# Patient Record
Sex: MALE | Race: WHITE | NOT HISPANIC OR LATINO | Employment: OTHER | URBAN - METROPOLITAN AREA
[De-identification: names, ages, dates, MRNs, and addresses within clinical notes are randomized per-mention and may not be internally consistent; named-entity substitution may affect disease eponyms.]

---

## 2017-02-17 DIAGNOSIS — E78.5 HYPERLIPIDEMIA LDL GOAL <130: ICD-10-CM

## 2017-02-17 DIAGNOSIS — E03.9 HYPOTHYROIDISM, UNSPECIFIED TYPE: ICD-10-CM

## 2017-02-17 DIAGNOSIS — Z00.00 ROUTINE GENERAL MEDICAL EXAMINATION AT A HEALTH CARE FACILITY: ICD-10-CM

## 2017-02-17 LAB
ALBUMIN SERPL-MCNC: 4 G/DL (ref 3.4–5)
ALP SERPL-CCNC: 47 U/L (ref 40–150)
ALT SERPL W P-5'-P-CCNC: 42 U/L (ref 0–70)
ANION GAP SERPL CALCULATED.3IONS-SCNC: 8 MMOL/L (ref 3–14)
AST SERPL W P-5'-P-CCNC: 22 U/L (ref 0–45)
BASOPHILS # BLD AUTO: 0 10E9/L (ref 0–0.2)
BASOPHILS NFR BLD AUTO: 0.6 %
BILIRUB DIRECT SERPL-MCNC: 0.1 MG/DL (ref 0–0.2)
BILIRUB SERPL-MCNC: 0.5 MG/DL (ref 0.2–1.3)
BUN SERPL-MCNC: 22 MG/DL (ref 7–30)
CALCIUM SERPL-MCNC: 9 MG/DL (ref 8.5–10.1)
CHLORIDE SERPL-SCNC: 107 MMOL/L (ref 94–109)
CHOLEST SERPL-MCNC: 135 MG/DL
CO2 SERPL-SCNC: 25 MMOL/L (ref 20–32)
CREAT SERPL-MCNC: 1.01 MG/DL (ref 0.66–1.25)
DIFFERENTIAL METHOD BLD: NORMAL
EOSINOPHIL # BLD AUTO: 0.4 10E9/L (ref 0–0.7)
EOSINOPHIL NFR BLD AUTO: 5.3 %
ERYTHROCYTE [DISTWIDTH] IN BLOOD BY AUTOMATED COUNT: 14 % (ref 10–15)
GFR SERPL CREATININE-BSD FRML MDRD: 75 ML/MIN/1.7M2
GLUCOSE SERPL-MCNC: 116 MG/DL (ref 70–99)
HBA1C MFR BLD: 5.5 % (ref 4.3–6)
HCT VFR BLD AUTO: 44.6 % (ref 40–53)
HDLC SERPL-MCNC: 48 MG/DL
HGB BLD-MCNC: 14.9 G/DL (ref 13.3–17.7)
LDLC SERPL CALC-MCNC: 68 MG/DL
LYMPHOCYTES # BLD AUTO: 2 10E9/L (ref 0.8–5.3)
LYMPHOCYTES NFR BLD AUTO: 27.8 %
MCH RBC QN AUTO: 32 PG (ref 26.5–33)
MCHC RBC AUTO-ENTMCNC: 33.4 G/DL (ref 31.5–36.5)
MCV RBC AUTO: 96 FL (ref 78–100)
MONOCYTES # BLD AUTO: 1 10E9/L (ref 0–1.3)
MONOCYTES NFR BLD AUTO: 13.8 %
NEUTROPHILS # BLD AUTO: 3.7 10E9/L (ref 1.6–8.3)
NEUTROPHILS NFR BLD AUTO: 52.5 %
NONHDLC SERPL-MCNC: 87 MG/DL
PLATELET # BLD AUTO: 226 10E9/L (ref 150–450)
POTASSIUM SERPL-SCNC: 4.4 MMOL/L (ref 3.4–5.3)
PROT SERPL-MCNC: 7 G/DL (ref 6.8–8.8)
PSA SERPL-ACNC: 1.14 UG/L (ref 0–4)
RBC # BLD AUTO: 4.65 10E12/L (ref 4.4–5.9)
SODIUM SERPL-SCNC: 140 MMOL/L (ref 133–144)
T4 FREE SERPL-MCNC: 0.99 NG/DL (ref 0.76–1.46)
TRIGL SERPL-MCNC: 95 MG/DL
TSH SERPL DL<=0.05 MIU/L-ACNC: 7.6 MU/L (ref 0.4–4)
WBC # BLD AUTO: 7.1 10E9/L (ref 4–11)

## 2017-02-17 PROCEDURE — 80048 BASIC METABOLIC PNL TOTAL CA: CPT | Performed by: INTERNAL MEDICINE

## 2017-02-17 PROCEDURE — 84439 ASSAY OF FREE THYROXINE: CPT | Performed by: INTERNAL MEDICINE

## 2017-02-17 PROCEDURE — G0103 PSA SCREENING: HCPCS | Performed by: INTERNAL MEDICINE

## 2017-02-17 PROCEDURE — 36415 COLL VENOUS BLD VENIPUNCTURE: CPT | Performed by: INTERNAL MEDICINE

## 2017-02-17 PROCEDURE — 83036 HEMOGLOBIN GLYCOSYLATED A1C: CPT | Performed by: INTERNAL MEDICINE

## 2017-02-17 PROCEDURE — 80076 HEPATIC FUNCTION PANEL: CPT | Performed by: INTERNAL MEDICINE

## 2017-02-17 PROCEDURE — 80061 LIPID PANEL: CPT | Performed by: INTERNAL MEDICINE

## 2017-02-17 PROCEDURE — 84443 ASSAY THYROID STIM HORMONE: CPT | Performed by: INTERNAL MEDICINE

## 2017-02-17 PROCEDURE — 85025 COMPLETE CBC W/AUTO DIFF WBC: CPT | Performed by: INTERNAL MEDICINE

## 2017-02-24 ENCOUNTER — OFFICE VISIT (OUTPATIENT)
Dept: OTHER | Facility: CLINIC | Age: 62
End: 2017-02-24
Attending: INTERNAL MEDICINE
Payer: COMMERCIAL

## 2017-02-24 VITALS
BODY MASS INDEX: 40.89 KG/M2 | RESPIRATION RATE: 12 BRPM | DIASTOLIC BLOOD PRESSURE: 72 MMHG | SYSTOLIC BLOOD PRESSURE: 118 MMHG | WEIGHT: 285 LBS | HEART RATE: 78 BPM

## 2017-02-24 DIAGNOSIS — E78.5 HYPERLIPIDEMIA LDL GOAL <130: ICD-10-CM

## 2017-02-24 DIAGNOSIS — E03.9 HYPOTHYROIDISM, UNSPECIFIED TYPE: ICD-10-CM

## 2017-02-24 DIAGNOSIS — Z00.00 ROUTINE GENERAL MEDICAL EXAMINATION AT A HEALTH CARE FACILITY: Primary | ICD-10-CM

## 2017-02-24 DIAGNOSIS — R73.01 IFG (IMPAIRED FASTING GLUCOSE): ICD-10-CM

## 2017-02-24 PROCEDURE — 99396 PREV VISIT EST AGE 40-64: CPT | Mod: ZP | Performed by: INTERNAL MEDICINE

## 2017-02-24 PROCEDURE — 99213 OFFICE O/P EST LOW 20 MIN: CPT | Mod: ZP | Performed by: INTERNAL MEDICINE

## 2017-02-24 PROCEDURE — 99211 OFF/OP EST MAY X REQ PHY/QHP: CPT

## 2017-02-24 RX ORDER — LEVOTHYROXINE SODIUM 125 UG/1
125 TABLET ORAL DAILY
Qty: 90 TABLET | Refills: 3 | Status: CANCELLED | OUTPATIENT
Start: 2017-02-24

## 2017-02-24 RX ORDER — SIMVASTATIN 40 MG
40 TABLET ORAL AT BEDTIME
Qty: 90 TABLET | Refills: 3 | Status: SHIPPED | OUTPATIENT
Start: 2017-02-24 | End: 2018-04-09

## 2017-02-24 RX ORDER — LEVOTHYROXINE SODIUM 137 UG/1
137 TABLET ORAL DAILY
Qty: 90 TABLET | Refills: 3 | Status: ON HOLD | OUTPATIENT
Start: 2017-02-24 | End: 2017-11-29

## 2017-02-24 NOTE — NURSING NOTE
"Chief Complaint   Patient presents with     RECHECK     Follow up for lab work.        Initial /70 (BP Location: Right arm, Patient Position: Chair, Cuff Size: Adult Regular)  Pulse 78  Wt 285 lb (129.3 kg)  BMI 40.89 kg/m2 Estimated body mass index is 40.89 kg/(m^2) as calculated from the following:    Height as of 1/5/16: 5' 10\" (1.778 m).    Weight as of this encounter: 285 lb (129.3 kg).  Medication Reconciliation: complete     Face to face nursing time: 8 minutes    Ingrid Montalvo MA        "

## 2017-02-24 NOTE — PROGRESS NOTES
SUBJECTIVE:    CC: Daniel Villanueva is a 61 year old male who presents for:       Routine general medical examination at a health care facility  Hyperlipidemia LDL goal <130  IFG (impaired fasting glucose)  Hypothyroidism, unspecified type          HISTORIES:    PROBLEM LIST:                   Patient Active Problem List   Diagnosis     Hypothyroidism     CARDIOVASCULAR SCREENING; LDL GOAL LESS THAN 130     Hyperlipidemia LDL goal <130       PAST MEDICAL HISTORY:                  Past Medical History   Diagnosis Date     Gynecomastia      Hyperlipidemia      Unspecified hypothyroidism        PAST SURGICAL HISTORY:                No past surgical history on file.    CURRENT MEDICATIONS:                  Current Outpatient Prescriptions   Medication Sig Dispense Refill     simvastatin (ZOCOR) 40 MG tablet Take 1 tablet (40 mg) by mouth At Bedtime 90 tablet 3     levothyroxine (SYNTHROID/LEVOTHROID) 137 MCG tablet Take 1 tablet (137 mcg) by mouth daily 90 tablet 3     Omega-3 Fatty Acids (OMEGA-3 FISH OIL PO)        ASPIRIN PO Take 81 mg by mouth daily       [DISCONTINUED] simvastatin (ZOCOR) 40 MG tablet Take 1 tablet (40 mg) by mouth At Bedtime 90 tablet 3     [DISCONTINUED] levothyroxine (SYNTHROID, LEVOTHROID) 125 MCG tablet Take 1 tablet (125 mcg) by mouth daily 90 tablet 3       ALLERGIES:                No Known Allergies    SOCIAL HISTORY:                  Social History     Social History     Marital status:      Spouse name: Jennifer     Number of children: 1     Years of education: N/A     Occupational History      [employer]     Social History Main Topics     Smoking status: Never Smoker     Smokeless tobacco: Former User      Comment: Years ago     Alcohol use Not on file     Drug use: Not on file     Sexual activity: Not on file     Other Topics Concern     Not on file     Social History Narrative       FAMILY HISTORY:                   Family History   Problem Relation Age of  Onset     CANCER Maternal Grandmother      lung cancer in a smoker                             REVIEW OF SYSTEMS:  CONSTITUTIONAL:no malaise, fatigue, or other general symptoms  EYES: no subjective changes in visual acuity, no photophobia  ENT/MOUTH: no complaints of rhinorrhea, nasal congestion, sore throat, hearing changes  RESP:no SOB  CV: no c/o exertional chest pressure or CHUNG  GI: No abdominal pain, constipation, change in bowel movements, nausea, pyrosis, BRBPR  :no polyuria or polydipsia, no dysuria, no gross hematuria  MUSCULOSKELATAL:no arthalgias or myalgias  INTEGUMENTARY/SKIN: no pruritis, rashes, or moles with recent change in size, shape, or pigmentation  BREAST: no lumps, masses, or discharges  NEURO: no gross sensory or motor symptoms, no dizziness, no confusion  ENDOCRINE: no polyuria or polydipsia, no heat or cold intolerance  HEME/ALLERGY/IMMUNE: no fevers, chills, night sweats, or unwanted weight loss  PSYCHIATRIC: no depression, anxiety, or internal stimuli    EXAM:    /70 (BP Location: Right arm, Patient Position: Chair, Cuff Size: Adult Regular)  Pulse 78  Wt 285 lb (129.3 kg)  BMI 40.89 kg/m2  BMI: Body mass index is 40.89 kg/(m^2).  GENERAL APPEARANCE: healthy, alert and no distress   EXAM:  EYES: clear conjunctiva, no cataracts, no obvious fundoscopic abnormalities  HENT: oropharynx, nares, and TMs are WNL  NECK: no JVD, thyromegaly or lymphadenopathy, no cervical bruits  RESP: clear to auscultation without rales, wheezes, or rhonchi  CV: RRR, no murmurs, gallops, or rubs  LYMPH: no cervical , axillary, or inguinal lymphadenopathy appreciated  GI: NABS, ND/NT, no masses or organomegally appreciated  : normal external genitalia and anus, no lumps, masses, or tenderness noted on palpation of the normally sized prostate  MS: no obvoius clinicallly relevant arthropathy, no evidence vasculitis  SKIN: no nevi clinically suspicious for malignancy are noted  NEURO: CN II-XII intact, no  localizing sensory or motor abnoramlities noted, DTRs symmetrical bilaterally  PSYCH: Mental status exam reveals the pt to have normal mood and affect. There is no disorder of thought form or content. There is no response to internal stimuli. There is no suicidal or homicidal ideation.           (Z00.00) Routine general medical examination at a health care facility  (primary encounter diagnosis)  Comment: due for colonosocpy in 2026; NASIM deferred due to normal PSA  Plan: Follow-Up with Vascular Medicine        RTC one year for annual physical    (E78.5) Hyperlipidemia LDL goal <130  Comment: at goal, continue zocor  Plan: simvastatin (ZOCOR) 40 MG tablet, OFFICE/OUTPT         VISIT,EST,LEVL III, Follow-Up with Vascular         Medicine           (R73.01) IFG (impaired fasting glucose)  Comment: A1C normal, avoid CHO, advised weight loss  Plan: OFFICE/OUTPT VISIT,EST,LEVL III, Follow-Up with        Vascular Medicine, Hemoglobin A1c, Albumin         Random Urine Quantitative, Basic metabolic         panel           (E03.9) Hypothyroidism, unspecified type  Comment: TSH high, increased synthroid form 125 to 137 mcg daily, advised rtc three months for TFT recheck  Plan: OFFICE/OUTPT VISIT,EST,LEVL III, levothyroxine         (SYNTHROID/LEVOTHROID) 137 MCG tablet,         Follow-Up with Vascular Medicine, T4 free, T3         Free, TSH    Greater than one half the 15 minutes not spent on preventive care during this visit was spent providing aducation and counselling regarding item(s) # 2 onward as delineated above.                                    I have discussed with patient the risks, benefits, medications, treatment options and modalities.   I have instructed the patient to call or schedule a follow-up appointment if any problems or failure to improve.

## 2017-02-24 NOTE — MR AVS SNAPSHOT
After Visit Summary   2/24/2017    Daniel Villanueva    MRN: [id number]           Patient Information     Date Of Birth          1955        Visit Information        Provider Department      2/24/2017 8:30 AM Oneal Whitehead MD [facility] at  [facility] [place]      Today's Diagnoses     Routine general medical examination at a health care facility    -  1    Hyperlipidemia LDL goal <130        IFG (impaired fasting glucose)        Hypothyroidism, unspecified type           Follow-ups after your visit        Additional Services     Follow-Up with Vascular Medicine                 Future tests that were ordered for you today     Open Future Orders        Priority Expected Expires Ordered    Follow-Up with Vascular Medicine Routine 5/24/2017 6/24/2017 2/24/2017    T4 free Routine 5/24/2017 6/24/2017 2/24/2017    T3 Free Routine 5/24/2017 6/24/2017 2/24/2017    TSH Routine 5/24/2017 6/24/2017 2/24/2017    Hemoglobin A1c Routine 5/24/2017 6/24/2017 2/24/2017    Albumin Random Urine Quantitative Routine 5/24/2017 6/24/2017 2/24/2017    Basic metabolic panel Routine 5/24/2017 6/24/2017 2/24/2017            Who to contact     If you have questions or need follow up information about today's clinic visit or your schedule please contact [facility] directly at [phone].  Normal or non-critical lab and imaging results will be communicated to you by MyChart, letter or phone within 4 business days after the clinic has received the results. If you do not hear from us within 7 days, please contact the clinic through MyChart or phone. If you have a critical or abnormal lab result, we will notify you by phone as soon as possible.  Submit refill requests through [employer] or call your pharmacy and they will forward the refill request to us. Please allow 3 business days for your refill to be completed.          Additional Information About  Your Visit        [employer] Information     [facility] gives you secure access to your electronic health record. If you see a primary care provider, you can also send messages to your care team and make appointments. If you have questions, please call your primary care clinic.  If you do not have a primary care provider, please call [phone] and they will assist you.        Care EveryWhere ID     This is your Care EveryWhere ID. This could be used by other organizations to access your [facility] medical records  [id number]        Your Vitals Were     Pulse Respirations BMI (Body Mass Index)             78 12 40.89 kg/m2          Blood Pressure from Last 3 Encounters:   02/24/17 118/72   01/05/16 121/77   01/07/15 114/76    Weight from Last 3 Encounters:   02/24/17 285 lb (129.3 kg)   01/05/16 276 lb (125.2 kg)   01/07/15 258 lb 12.8 oz (117.4 kg)              We Performed the Following     Follow-Up with Vascular Medicine     OFFICE/OUTPT VISIT,EST,LEVL III          Today's Medication Changes          These changes are accurate as of: 2/24/17  9:34 AM.  If you have any questions, ask your nurse or doctor.               These medicines have changed or have updated prescriptions.        Dose/Directions    levothyroxine 137 MCG tablet   Commonly known as:  SYNTHROID/LEVOTHROID   This may have changed:    - medication strength  - how much to take   Used for:  Hypothyroidism, unspecified type   Changed by:  Oneal Whitehead MD        Dose:  137 mcg   Take 1 tablet (137 mcg) by mouth daily   Quantity:  90 tablet   Refills:  3            Where to get your medicines      These medications were sent to [facility] [id number] [place] [address] [address] AT [facility] & [address] [place], [place] [id number], [facility] [address]     Phone:  [phone]     levothyroxine 137 MCG tablet    simvastatin 40 MG tablet                Primary Care Provider Office Phone # Fax #    Oneal Whitehead MD  [phone]       [facility] [address]  [place] [address]        Thank you!     Thank you for choosing [facility] [place]  for your care. Our goal is always to provide you with excellent care. Hearing back from our patients is one way we can continue to improve our services. Please take a few minutes to complete the written survey that you may receive in the mail after your visit with us. Thank you!             Your Updated Medication List - Protect others around you: Learn how to safely use, store and throw away your medicines at www.disposemymeds.org.          This list is accurate as of: 2/24/17  9:34 AM.  Always use your most recent med list.                   Brand Name Dispense Instructions for use    ASPIRIN PO      Take 81 mg by mouth daily       levothyroxine 137 MCG tablet    SYNTHROID/LEVOTHROID    90 tablet    Take 1 tablet (137 mcg) by mouth daily       OMEGA-3 FISH OIL PO          simvastatin 40 MG tablet    ZOCOR    90 tablet    Take 1 tablet (40 mg) by mouth At Bedtime

## 2017-04-12 ENCOUNTER — TELEPHONE (OUTPATIENT)
Dept: URGENT CARE | Facility: URGENT CARE | Age: 62
End: 2017-04-12

## 2017-04-12 ENCOUNTER — OFFICE VISIT (OUTPATIENT)
Dept: URGENT CARE | Facility: URGENT CARE | Age: 62
End: 2017-04-12
Payer: OTHER MISCELLANEOUS

## 2017-04-12 VITALS
SYSTOLIC BLOOD PRESSURE: 150 MMHG | WEIGHT: 292 LBS | HEART RATE: 89 BPM | BODY MASS INDEX: 41.9 KG/M2 | TEMPERATURE: 98 F | DIASTOLIC BLOOD PRESSURE: 76 MMHG | OXYGEN SATURATION: 95 %

## 2017-04-12 DIAGNOSIS — M54.9 UPPER BACK PAIN ON LEFT SIDE: Primary | ICD-10-CM

## 2017-04-12 PROCEDURE — 99214 OFFICE O/P EST MOD 30 MIN: CPT | Performed by: PHYSICIAN ASSISTANT

## 2017-04-12 RX ORDER — IBUPROFEN 800 MG/1
800 TABLET, FILM COATED ORAL EVERY 8 HOURS PRN
Qty: 60 TABLET | Refills: 0 | Status: SHIPPED | OUTPATIENT
Start: 2017-04-12 | End: 2017-11-07

## 2017-04-12 RX ORDER — HYDROCODONE BITARTRATE AND ACETAMINOPHEN 5; 325 MG/1; MG/1
1 TABLET ORAL EVERY 6 HOURS PRN
Qty: 20 TABLET | Refills: 0 | Status: SHIPPED | OUTPATIENT
Start: 2017-04-12 | End: 2017-11-07

## 2017-04-12 NOTE — TELEPHONE ENCOUNTER
Eliz Ch ([facility] Nurse Case Management). 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     Per provider Annie Dey PA-C - who states letter is as written/non-negotiable and pt is to follow up with a work comp provider.    has been notified.  FRANK Conner MA

## 2017-04-12 NOTE — PROGRESS NOTES
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  No direct trauma to his shoulder, but he felt immediate pain when he forcefully opened the franklin .  He did not fall onto his back or shoulder.  Denies any head trauma.    Some tingling in his left 4th and 5th fingers, intermittent.  Took otc meds this afternoon motrin, without relief.    Range of motion is significantly decreased.    Slight pressure to area on back when leaning against chair increases his pain.        Past Medical History:   Diagnosis Date     Gynecomastia      Hyperlipidemia      Unspecified hypothyroidism      Patient Active Problem List   Diagnosis     Hypothyroidism     CARDIOVASCULAR SCREENING; LDL GOAL LESS THAN 130     Hyperlipidemia LDL goal <130     Social History   Substance Use Topics     Smoking status: Never Smoker     Smokeless tobacco: Former User      Comment: Years ago     Alcohol use Not on file       ROS:  CONSTITUTIONAL:NEGATIVE for fever, chills, change in weight  INTEGUMENTARY/SKIN: NEGATIVE for worrisome rashes, moles or lesions  ENT/MOUTH: NEGATIVE for ear, mouth and throat problems  RESP:NEGATIVE for significant cough or SOB  CV: NEGATIVE for chest pain, palpitations or peripheral edema  MUSCULOSKELETAL: as per HPI  NEURO: as per HPI    EXAM:   /76 (BP Location: Right arm, Patient Position: Chair, Cuff Size: Adult Large)  Pulse 89  Temp 98  F (36.7  C) (Oral)  Wt 292 lb (132.5 kg)  SpO2 95%  BMI 41.9 kg/m2  Gen: healthy,alert,in moderate distress secondary to pain  Extremity: left arm: ROM is limited secondary to pain in upper back medial to scapula and over trapezius.  Palpable spasm.  No vertebral tenderness.    There is not compromise  to the distal circulation.  Pulses are +2 and CRT is brisk  GENERAL APPEARANCE: healthy, alert and no distress  SKIN: no suspicious lesions or rashes  NEURO: Normal strength and tone, sensory exam grossly normal, mentation intact and speech normal  VASC: cap refill is less than 2 seconds in the affected extremity.       (M54.9) Upper back pain on left side  (primary encounter diagnosis)  Comment:   Plan: ibuprofen (ADVIL/MOTRIN) 800 MG tablet,         HYDROcodone-acetaminophen (NORCO) 5-325 MG per         tablet        Ice to area 20 minutes TID over thin cloth.    Follow up with work comp or ortho by 4/19/17.  See workability letter for specific restrictions.

## 2017-04-12 NOTE — LETTER
[facility] [address] [address]  [phone]        2017    REPORT OF WORK ABILITY    PATIENT DATA  Employee Name: Daniel Villanueva        : 1955   [id number]  Work related injury: YES  Today's date: 2017  Date of injury: 2017     PROVIDER EVALUATION: Please fill in as needed.  Please give copy to employee for employer.  1. Diagnosis: left upper back pain   2. Treatment: Ice to area 20 minutes over thin cloth three times a day  3. Medication: Ibuprofen, Vicodin as prescribed.    NOTE: When ordering a medication, [place] Rules require Work Comp or WC on prescriptions.  4. Return to work date: 17 with no used of left arm until 17.         RESTRICTIONS:   Restrictions apply to home and leisure also.  If work within restrictions is not available, the employee is totally disabled.  Provider comments: Must have follow up appointment with work comp approved clinic (such as work comp clinic or ortho clinic) BY 17.  Contact your work comp coordinator to facilitate appointment.  Medical Examiner: Annie Montgomery      License or registration: [id number]    Next appointment: BY 17 as above    CC: Employer, Managed Care Plan/Payor, Patient

## 2017-04-12 NOTE — MR AVS SNAPSHOT
After Visit Summary   4/12/2017    Daniel Villanueva    MRN: [id number]           Patient Information     Date Of Birth          1955        Visit Information        Provider Department      4/12/2017 4:45 PM Annie Mendoza PA-C [facility]        Today's Diagnoses     Upper back pain on left side    -  1       Follow-ups after your visit        Who to contact     If you have questions or need follow up information about today's clinic visit or your schedule please contact [facility] directly at [phone].  Normal or non-critical lab and imaging results will be communicated to you by MyChart, letter or phone within 4 business days after the clinic has received the results. If you do not hear from us within 7 days, please contact the clinic through [employer] or phone. If you have a critical or abnormal lab result, we will notify you by phone as soon as possible.  Submit refill requests through [facility] or call your pharmacy and they will forward the refill request to us. Please allow 3 business days for your refill to be completed.          Additional Information About Your Visit        MyChart Information     [facility] gives you secure access to your electronic health record. If you see a primary care provider, you can also send messages to your care team and make appointments. If you have questions, please call your primary care clinic.  If you do not have a primary care provider, please call [phone] and they will assist you.        Care EveryWhere ID     This is your Care EveryWhere ID. This could be used by other organizations to access your [place] medical records  [id number]        Your Vitals Were     Pulse Temperature Pulse Oximetry BMI (Body Mass Index)          89 98  F (36.7  C) (Oral) 95% 41.9 kg/m2         Blood Pressure from Last 3 Encounters:   04/12/17 150/76   02/24/17 118/72   01/05/16 121/77    Weight from  Last 3 Encounters:   04/12/17 292 lb (132.5 kg)   02/24/17 285 lb (129.3 kg)   01/05/16 276 lb (125.2 kg)              Today, you had the following     No orders found for display         Today's Medication Changes          These changes are accurate as of: 4/12/17  8:08 PM.  If you have any questions, ask your nurse or doctor.               Start taking these medicines.        Dose/Directions    HYDROcodone-acetaminophen 5-325 MG per tablet   Commonly known as:  NORCO   Used for:  Upper back pain on left side   Started by:  Annie Mendoza PA-C        Dose:  1 tablet   Take 1 tablet by mouth every 6 hours as needed   Quantity:  20 tablet   Refills:  0       ibuprofen 800 MG tablet   Commonly known as:  ADVIL/MOTRIN   Used for:  Upper back pain on left side   Started by:  Annie Mendoza PA-C        Dose:  800 mg   Take 1 tablet (800 mg) by mouth every 8 hours as needed for moderate pain   Quantity:  60 tablet   Refills:  0            Where to get your medicines      These medications were sent to [address] [address]     Phone:  [phone]     ibuprofen 800 MG tablet         Some of these will need a paper prescription and others can be bought over the counter.  Ask your nurse if you have questions.     Bring a paper prescription for each of these medications     HYDROcodone-acetaminophen 5-325 MG per tablet                Primary Care Provider Office Phone # Fax #    Oneal Whitehead [phone]       [facility] [address]  [place] [address]        Thank you!     Thank you for choosing [facility]  for your care. Our goal is always to provide you with excellent care. Hearing back from our patients is one way we can continue to improve our services. Please take a few minutes to complete the written survey that you may receive in the mail  after your visit with us. Thank you!             Your Updated Medication List - Protect others around you: Learn how to safely use, store and throw away your medicines at www.disposemymeds.org.          This list is accurate as of: 4/12/17  8:08 PM.  Always use your most recent med list.                   Brand Name Dispense Instructions for use    ASPIRIN PO      Take 81 mg by mouth daily       HYDROcodone-acetaminophen 5-325 MG per tablet    NORCO    20 tablet    Take 1 tablet by mouth every 6 hours as needed       ibuprofen 800 MG tablet    ADVIL/MOTRIN    60 tablet    Take 1 tablet (800 mg) by mouth every 8 hours as needed for moderate pain       levothyroxine 137 MCG tablet    SYNTHROID/LEVOTHROID    90 tablet    Take 1 tablet (137 mcg) by mouth daily       OMEGA-3 FISH OIL PO          simvastatin 40 MG tablet    ZOCOR    90 tablet    Take 1 tablet (40 mg) by mouth At Bedtime

## 2017-04-12 NOTE — NURSING NOTE
"Chief Complaint   Patient presents with     Shoulder Pain     lt shoulder injury at work this morning,sustained when opening the franklin to his truck     Work Comp       Initial /76 (BP Location: Right arm, Patient Position: Chair, Cuff Size: Adult Large)  Pulse 89  Temp 98  F (36.7  C) (Oral)  Wt 292 lb (132.5 kg)  SpO2 95%  BMI 41.9 kg/m2 Estimated body mass index is 41.9 kg/(m^2) as calculated from the following:    Height as of 1/5/16: 5' 10\" (1.778 m).    Weight as of this encounter: 292 lb (132.5 kg).  Medication Reconciliation: complete   Andrea HUBBARD      "

## 2017-10-10 DIAGNOSIS — Z01.818 PRE-OPERATIVE GENERAL PHYSICAL EXAMINATION: Primary | ICD-10-CM

## 2017-10-31 DIAGNOSIS — E03.9 HYPOTHYROIDISM, UNSPECIFIED TYPE: ICD-10-CM

## 2017-10-31 DIAGNOSIS — R73.01 IFG (IMPAIRED FASTING GLUCOSE): ICD-10-CM

## 2017-10-31 DIAGNOSIS — Z01.818 PRE-OPERATIVE GENERAL PHYSICAL EXAMINATION: ICD-10-CM

## 2017-10-31 LAB
ANION GAP SERPL CALCULATED.3IONS-SCNC: 6 MMOL/L (ref 3–14)
APTT PPP: 25 SEC (ref 22–37)
BUN SERPL-MCNC: 21 MG/DL (ref 7–30)
CALCIUM SERPL-MCNC: 9 MG/DL (ref 8.5–10.1)
CHLORIDE SERPL-SCNC: 106 MMOL/L (ref 94–109)
CO2 SERPL-SCNC: 27 MMOL/L (ref 20–32)
CREAT SERPL-MCNC: 0.96 MG/DL (ref 0.66–1.25)
GFR SERPL CREATININE-BSD FRML MDRD: 79 ML/MIN/1.7M2
GLUCOSE SERPL-MCNC: 107 MG/DL (ref 70–99)
HBA1C MFR BLD: 5.7 % (ref 4.3–6)
HGB BLD-MCNC: 15.4 G/DL (ref 13.3–17.7)
INR PPP: 1.03 (ref 0.86–1.14)
POTASSIUM SERPL-SCNC: 4.1 MMOL/L (ref 3.4–5.3)
SODIUM SERPL-SCNC: 139 MMOL/L (ref 133–144)
T4 FREE SERPL-MCNC: 1.18 NG/DL (ref 0.76–1.46)
TSH SERPL DL<=0.005 MIU/L-ACNC: 5.13 MU/L (ref 0.4–4)

## 2017-10-31 PROCEDURE — 36415 COLL VENOUS BLD VENIPUNCTURE: CPT | Performed by: INTERNAL MEDICINE

## 2017-10-31 PROCEDURE — 80048 BASIC METABOLIC PNL TOTAL CA: CPT | Performed by: INTERNAL MEDICINE

## 2017-10-31 PROCEDURE — 85730 THROMBOPLASTIN TIME PARTIAL: CPT | Performed by: INTERNAL MEDICINE

## 2017-10-31 PROCEDURE — 85018 HEMOGLOBIN: CPT | Performed by: INTERNAL MEDICINE

## 2017-10-31 PROCEDURE — 84481 FREE ASSAY (FT-3): CPT | Performed by: INTERNAL MEDICINE

## 2017-10-31 PROCEDURE — 85610 PROTHROMBIN TIME: CPT | Performed by: INTERNAL MEDICINE

## 2017-10-31 PROCEDURE — 83036 HEMOGLOBIN GLYCOSYLATED A1C: CPT | Performed by: INTERNAL MEDICINE

## 2017-10-31 PROCEDURE — 84443 ASSAY THYROID STIM HORMONE: CPT | Performed by: INTERNAL MEDICINE

## 2017-10-31 PROCEDURE — 82043 UR ALBUMIN QUANTITATIVE: CPT | Performed by: INTERNAL MEDICINE

## 2017-10-31 PROCEDURE — 84439 ASSAY OF FREE THYROXINE: CPT | Performed by: INTERNAL MEDICINE

## 2017-11-01 LAB
CREAT UR-MCNC: 215 MG/DL
MICROALBUMIN UR-MCNC: 14 MG/L
MICROALBUMIN/CREAT UR: 6.28 MG/G CR (ref 0–17)
T3FREE SERPL-MCNC: 2.9 PG/ML (ref 2.3–4.2)

## 2017-11-07 ENCOUNTER — OFFICE VISIT (OUTPATIENT)
Dept: OTHER | Facility: CLINIC | Age: 62
End: 2017-11-07
Attending: INTERNAL MEDICINE
Payer: COMMERCIAL

## 2017-11-07 VITALS
HEART RATE: 82 BPM | DIASTOLIC BLOOD PRESSURE: 79 MMHG | BODY MASS INDEX: 41.95 KG/M2 | HEIGHT: 70 IN | OXYGEN SATURATION: 93 % | SYSTOLIC BLOOD PRESSURE: 136 MMHG | WEIGHT: 293 LBS

## 2017-11-07 DIAGNOSIS — E03.8 OTHER SPECIFIED HYPOTHYROIDISM: ICD-10-CM

## 2017-11-07 DIAGNOSIS — Z01.818 PREOP GENERAL PHYSICAL EXAM: Primary | ICD-10-CM

## 2017-11-07 PROCEDURE — 99244 OFF/OP CNSLTJ NEW/EST MOD 40: CPT | Mod: ZP | Performed by: INTERNAL MEDICINE

## 2017-11-07 PROCEDURE — 99212 OFFICE O/P EST SF 10 MIN: CPT | Mod: 25

## 2017-11-07 PROCEDURE — 93010 ELECTROCARDIOGRAM REPORT: CPT | Mod: ZP | Performed by: INTERNAL MEDICINE

## 2017-11-07 RX ORDER — LEVOTHYROXINE SODIUM 150 UG/1
150 TABLET ORAL DAILY
Qty: 90 TABLET | Refills: 3 | Status: SHIPPED | OUTPATIENT
Start: 2017-11-07 | End: 2018-12-11

## 2017-11-07 RX ORDER — LEVOTHYROXINE SODIUM 125 UG/1
125 TABLET ORAL DAILY
Qty: 90 TABLET | Refills: 3 | Status: SHIPPED | OUTPATIENT
Start: 2017-11-07 | End: 2017-11-07

## 2017-11-07 NOTE — MR AVS SNAPSHOT
After Visit Summary   11/7/2017    Daniel Villanueva    MRN: [id number]           Patient Information     Date Of Birth          1955        Visit Information        Provider Department      11/7/2017 4:00 PM Oneal Whitehead MD [facility]      Today's Diagnoses     Preop general physical exam    -  1    Other specified hypothyroidism          Care Instructions      Before Your Surgery      Call your surgeon if there is any change in your health. This includes signs of a cold or flu (such as a sore throat, runny nose, cough, rash or fever).    Do not smoke, drink alcohol or take over the counter medicine (unless your surgeon or primary care doctor tells you to) for the 24 hours before and after surgery.    If you take prescribed drugs: Follow your doctor s orders about which medicines to take and which to stop until after surgery.    Eating and drinking prior to surgery: follow the instructions from your surgeon    Take a shower or bath the night before surgery. Use the soap your surgeon gave you to gently clean your skin. If you do not have soap from your surgeon, use your regular soap. Do not shave or scrub the surgery site.  Wear clean pajamas and have clean sheets on your bed.           Follow-ups after your visit        Your next 10 appointments already scheduled     Nov 29, 2017   Procedure with Mo Duncan MD   [facility] (--)    [address]  [facility] [id number] [phone]            Dec 13, 2017   Procedure with Mo Duncan MD   [facility] (--)    [address]  [facility] [id number] [phone]              Who to contact     If you have questions or need follow up information about today's clinic visit or your schedule please contact [facility] directly at [phone].  Normal or non-critical lab  "and imaging results will be communicated to you by MyChart, letter or phone within 4 business days after the clinic has received the results. If you do not hear from us within 7 days, please contact the clinic through [employer] or phone. If you have a critical or abnormal lab result, we will notify you by phone as soon as possible.  Submit refill requests through [employer] or call your pharmacy and they will forward the refill request to us. Please allow 3 business days for your refill to be completed.          Additional Information About Your Visit        [employer] Information     [employer] gives you secure access to your electronic health record. If you see a primary care provider, you can also send messages to your care team and make appointments. If you have questions, please call your primary care clinic.  If you do not have a primary care provider, please call [phone] and they will assist you.        Care EveryWhere ID     This is your Care EveryWhere ID. This could be used by other organizations to access your [facility] medical records  [id number]        Your Vitals Were     Pulse Height Pulse Oximetry BMI (Body Mass Index)          82 5' 10\" (1.778 m) 93% 42.04 kg/m2         Blood Pressure from Last 3 Encounters:   11/07/17 136/79   04/12/17 150/76   02/24/17 118/72    Weight from Last 3 Encounters:   11/07/17 293 lb (132.9 kg)   04/12/17 292 lb (132.5 kg)   02/24/17 285 lb (129.3 kg)              We Performed the Following     EKG 12-lead complete w/read - Clinics          Today's Medication Changes          These changes are accurate as of: 11/7/17 11:59 PM.  If you have any questions, ask your nurse or doctor.               These medicines have changed or have updated prescriptions.        Dose/Directions    * levothyroxine 137 MCG tablet   Commonly known as:  SYNTHROID/LEVOTHROID   This may have changed:  Another medication with the same name was added. Make sure you understand how and when to take each. "   Used for:  Hypothyroidism, unspecified type   Changed by:  Oneal Whitehead MD        Dose:  137 mcg   Take 1 tablet (137 mcg) by mouth daily   Quantity:  90 tablet   Refills:  3       * levothyroxine 150 MCG tablet   Commonly known as:  SYNTHROID/LEVOTHROID   This may have changed:  You were already taking a medication with the same name, and this prescription was added. Make sure you understand how and when to take each.   Used for:  Other specified hypothyroidism   Changed by:  Oneal Whitehead MD        Dose:  150 mcg   Take 1 tablet (150 mcg) by mouth daily   Quantity:  90 tablet   Refills:  3       * Notice:  This list has 2 medication(s) that are the same as other medications prescribed for you. Read the directions carefully, and ask your doctor or other care provider to review them with you.      Stop taking these medicines if you haven't already. Please contact your care team if you have questions.     HYDROcodone-acetaminophen 5-325 MG per tablet   Commonly known as:  NORCO   Stopped by:  Oneal Whitehead MD           ibuprofen 800 MG tablet   Commonly known as:  ADVIL/MOTRIN   Stopped by:  Oneal Whitehead MD                Where to get your medicines      These medications were sent to [facility] [address] [place] [address] AT [facility] [address] [facility] [address]     Phone:  [phone]     levothyroxine 150 MCG tablet                Primary Care Provider Office Phone # Fax #    Oneal Whitehead [phone]       [place] [facility] [address]  [facility] [address]        Equal Access to Services     [facility]: Hadii aad ku hadasho Soomaali, waaxda luqadaha, qaybta kaalmada adeeghodan, henry rodriguez. So [facility] [phone].    ATENCIÓN: Si habla español, tiene a barrera disposición servicios gratuitos de asistencia lingüística. Llame al  [phone].    We comply with applicable federal civil rights laws and [place] laws. We do not discriminate on the basis of race, color, national origin, age, disability, sex, sexual orientation, or gender identity.            Thank you!     Thank you for choosing [facility] [place]  for your care. Our goal is always to provide you with excellent care. Hearing back from our patients is one way we can continue to improve our services. Please take a few minutes to complete the written survey that you may receive in the mail after your visit with us. Thank you!             Your Updated Medication List - Protect others around you: Learn how to safely use, store and throw away your medicines at www.disposemymeds.org.          This list is accurate as of: 11/7/17 11:59 PM.  Always use your most recent med list.                   Brand Name Dispense Instructions for use Diagnosis    ASPIRIN PO      Take 81 mg by mouth daily        * levothyroxine 137 MCG tablet    SYNTHROID/LEVOTHROID    90 tablet    Take 1 tablet (137 mcg) by mouth daily    Hypothyroidism, unspecified type       * levothyroxine 150 MCG tablet    SYNTHROID/LEVOTHROID    90 tablet    Take 1 tablet (150 mcg) by mouth daily    Other specified hypothyroidism       OMEGA-3 FISH OIL PO           simvastatin 40 MG tablet    ZOCOR    90 tablet    Take 1 tablet (40 mg) by mouth At Bedtime    Hyperlipidemia LDL goal <130       * Notice:  This list has 2 medication(s) that are the same as other medications prescribed for you. Read the directions carefully, and ask your doctor or other care provider to review them with you.

## 2017-11-07 NOTE — PROGRESS NOTES
[facility]  [address]  [place] [address]  [phone]  Dept: [phone]    PRE-OP EVALUATION:  Today's date: 2017    Daniel Villanueva (: 1955) presents for pre-operative evaluation assessment as requested by Dr. Duncan.  He requires evaluation and anesthesia risk assessment prior to undergoing surgery/procedure for treatment of Cataracts .  Proposed procedure: Cataract removal bilaterally    Date of Surgery/ Procedure: 17 for OS, TBD for OD  Time of Surgery/ Procedure: 8:30 AM  Hospital/Surgical Facility: [facility]  Fax number for surgical facility: [phone]  Primary Physician: Oneal Whitehead  Type of Anesthesia Anticipated: General    Patient has a Health Care Directive or Living Will:  NO    1. NO - Do you have a history of heart attack, stroke, stent, bypass or surgery on an artery in the head, neck, heart or legs?  2. NO - Do you ever have any pain or discomfort in your chest?  3. NO - Do you have a history of  Heart Failure?  4. NO - Are you troubled by shortness of breath when: walking on the level, up a slight hill or at night?  5. NO - Do you currently have a cold, bronchitis or other respiratory infection?  6. NO - Do you have a cough, shortness of breath or wheezing?  7. NO - Do you sometimes get pains in the calves of your legs when you walk?  8. NO - Do you or anyone in your family have previous history of blood clots?  9. NO - Do you or does anyone in your family have a serious bleeding problem such as prolonged bleeding following surgeries or cuts?  10. NO - Have you ever had problems with anemia or been told to take iron pills?  11. NO - Have you had any abnormal blood loss such as black, tarry or bloody stools, or abnormal vaginal bleeding?  12. NO - Have you ever had a blood transfusion?  13. NO - Have you or any of your relatives ever had problems with anesthesia?  14. NO - Do you have sleep apnea,  excessive snoring or daytime drowsiness?  15. NO - Do you have any prosthetic heart valves?  16. NO - Do you have prosthetic joints?  17. NO - Is there any chance that you may be pregnant?        HPI:                                                      Brief HPI related to upcoming procedure: Patient has had cataracts for five years, and they have now begun to sufficiently impede vision such that the patietn would like to have definitve elective surgical management undertaken,      See problem list for active medical problems.  Problems all longstanding and stable, except as noted/documented.  See ROS for pertinent symptoms related to these conditions.                                                                                                  .    MEDICAL HISTORY:                                                    Patient Active Problem List    Diagnosis Date Noted     Hyperlipidemia LDL goal <130 12/19/2012     Priority: Medium     CARDIOVASCULAR SCREENING; LDL GOAL LESS THAN 130 10/31/2010     Priority: Medium     Hypothyroidism 05/14/2007     Priority: Medium     Problem list name updated by automated process. Provider to review        Past Medical History:   Diagnosis Date     Gynecomastia      Hyperlipidemia      Unspecified hypothyroidism      History reviewed. No pertinent surgical history.  Current Outpatient Prescriptions   Medication Sig Dispense Refill     simvastatin (ZOCOR) 40 MG tablet Take 1 tablet (40 mg) by mouth At Bedtime 90 tablet 3     levothyroxine (SYNTHROID/LEVOTHROID) 137 MCG tablet Take 1 tablet (137 mcg) by mouth daily 90 tablet 3     Omega-3 Fatty Acids (OMEGA-3 FISH OIL PO)        ASPIRIN PO Take 81 mg by mouth daily       OTC products: Aspirin    No Known Allergies   Latex Allergy: NO    Social History   Substance Use Topics     Smoking status: Never Smoker     Smokeless tobacco: Former User      Comment: Years ago     Alcohol use Not on file     History   Drug Use Not on file        REVIEW OF SYSTEMS:                                                    C: NEGATIVE for fever, chills, change in weight  I: NEGATIVE for worrisome rashes, moles or lesions  EYES: POSITIVE for blurred vision bilateral  E/M: NEGATIVE for ear, mouth and throat problems  R: NEGATIVE for significant cough or SOB  B: NEGATIVE for masses, tenderness or discharge  CV: NEGATIVE for chest pain, palpitations or peripheral edema  GI: NEGATIVE for nausea, abdominal pain, heartburn, or change in bowel habits  : NEGATIVE for frequency, dysuria, or hematuria  M: NEGATIVE for significant arthralgias or myalgia  N: NEGATIVE for weakness, dizziness or paresthesias  E: NEGATIVE for temperature intolerance, skin/hair changes  H: NEGATIVE for bleeding problems  P: NEGATIVE for changes in mood or affect    EXAM:                                                    There were no vitals taken for this visit.    GENERAL APPEARANCE: healthy, alert and no distress     EYES: bilateral cataracts Lt >> Rt     HENT: ear canals and TM's normal and nose and mouth without ulcers or lesions     NECK: no adenopathy, no asymmetry, masses, or scars and thyroid normal to palpation     RESP: lungs clear to auscultation - no rales, rhonchi or wheezes     CV: regular rates and rhythm, normal S1 S2, no S3 or S4 and no murmur, click or rub     ABDOMEN:  soft, nontender, no HSM or masses and bowel sounds normal     MS: extremities normal- no gross deformities noted, no evidence of inflammation in joints, FROM in all extremities.     SKIN: no suspicious lesions or rashes     NEURO: Normal strength and tone, sensory exam grossly normal, mentation intact and speech normal     PSYCH: mentation appears normal. and affect normal/bright     LYMPHATICS: No axillary, cervical, or supraclavicular nodes    DIAGNOSTICS:                                                    EKG: appears normal, NSR, normal axis, normal intervals, no acute ST/T changes c/w ischemia, no LVH  by voltage criteria    Recent Labs   Lab Test  10/31/17   0731  02/17/17   0723  12/28/15   0745   HGB  15.4  14.9  14.6   PLT   --   226  196   INR  1.03   --    --    NA  139  140  139   POTASSIUM  4.1  4.4  4.5   CR  0.96  1.01  0.92   A1C  5.7  5.5  5.7        IMPRESSION:                                                    Reason for surgery/procedure: cataracts  bilaterally  Diagnosis/reason for consult: hyperlipdemia    The proposed surgical procedure is considered LOW risk.    REVISED CARDIAC RISK INDEX  The patient has the following serious cardiovascular risks for perioperative complications such as (MI, PE, VFib and 3  AV Block):  No serious cardiac risks  INTERPRETATION: 0 risks: Class I (very low risk - 0.4% complication rate)    The patient has the following additional risks for perioperative complications:  No identified additional risks      ICD-10-CM    1. Preop general physical exam Z01.818 EKG 12-lead complete w/read - Clinics   2. Other specified hypothyroidism E03.8 levothyroxine (SYNTHROID/LEVOTHROID) 150 MCG tablet     DISCONTINUED: levothyroxine (SYNTHROID/LEVOTHROID) 125 MCG tablet       RECOMMENDATIONS:                                                          --Patient is to take all scheduled medications on the day of surgery.    APPROVAL GIVEN to proceed with proposed procedure, without further diagnostic evaluation       Signed Electronically by: Oneal Whitehead MD    Copy of this evaluation report is provided to requesting physician.    [facility] Preop Guidelines

## 2017-11-07 NOTE — NURSING NOTE
"Chief Complaint   Patient presents with     RECHECK     Pre-op for cataract surgery at end of November @ [facility]. Pt was also due for labs and f/u in 05/2017. Fasting labs done at  lab on 10/31/17       Initial /79 (BP Location: Right arm, Patient Position: Chair, Cuff Size: Adult Large)  Pulse 82  Ht 5' 10\" (1.778 m)  Wt 293 lb (132.9 kg)  SpO2 93%  BMI 42.04 kg/m2 Estimated body mass index is 42.04 kg/(m^2) as calculated from the following:    Height as of this encounter: 5' 10\" (1.778 m).    Weight as of this encounter: 293 lb (132.9 kg).  Medication Reconciliation: complete    Face to Face time 17 minutes  Jenna Aggarwal CMA      "

## 2017-11-28 NOTE — H&P (VIEW-ONLY)
[facility]  [address]  [place] [address]  [phone]  Dept: [phone]    PRE-OP EVALUATION:  Today's date: 2017    Daniel Villanueva (: 1955) presents for pre-operative evaluation assessment as requested by Dr. Duncan.  He requires evaluation and anesthesia risk assessment prior to undergoing surgery/procedure for treatment of Cataracts .  Proposed procedure: Cataract removal bilaterally    Date of Surgery/ Procedure: 17 for OS, TBD for OD  Time of Surgery/ Procedure: 8:30 AM  Hospital/Surgical Facility: [facility]  Fax number for surgical facility: [phone]  Primary Physician: Oneal Whitehead  Type of Anesthesia Anticipated: General    Patient has a Health Care Directive or Living Will:  NO    1. NO - Do you have a history of heart attack, stroke, stent, bypass or surgery on an artery in the head, neck, heart or legs?  2. NO - Do you ever have any pain or discomfort in your chest?  3. NO - Do you have a history of  Heart Failure?  4. NO - Are you troubled by shortness of breath when: walking on the level, up a slight hill or at night?  5. NO - Do you currently have a cold, bronchitis or other respiratory infection?  6. NO - Do you have a cough, shortness of breath or wheezing?  7. NO - Do you sometimes get pains in the calves of your legs when you walk?  8. NO - Do you or anyone in your family have previous history of blood clots?  9. NO - Do you or does anyone in your family have a serious bleeding problem such as prolonged bleeding following surgeries or cuts?  10. NO - Have you ever had problems with anemia or been told to take iron pills?  11. NO - Have you had any abnormal blood loss such as black, tarry or bloody stools, or abnormal vaginal bleeding?  12. NO - Have you ever had a blood transfusion?  13. NO - Have you or any of your relatives ever had problems with anesthesia?  14. NO - Do you have sleep apnea,  excessive snoring or daytime drowsiness?  15. NO - Do you have any prosthetic heart valves?  16. NO - Do you have prosthetic joints?  17. NO - Is there any chance that you may be pregnant?        HPI:                                                      Brief HPI related to upcoming procedure: Patient has had cataracts for five years, and they have now begun to sufficiently impede vision such that the patietn would like to have definitve elective surgical management undertaken,      See problem list for active medical problems.  Problems all longstanding and stable, except as noted/documented.  See ROS for pertinent symptoms related to these conditions.                                                                                                  .    MEDICAL HISTORY:                                                    Patient Active Problem List    Diagnosis Date Noted     Hyperlipidemia LDL goal <130 12/19/2012     Priority: Medium     CARDIOVASCULAR SCREENING; LDL GOAL LESS THAN 130 10/31/2010     Priority: Medium     Hypothyroidism 05/14/2007     Priority: Medium     Problem list name updated by automated process. Provider to review        Past Medical History:   Diagnosis Date     Gynecomastia      Hyperlipidemia      Unspecified hypothyroidism      History reviewed. No pertinent surgical history.  Current Outpatient Prescriptions   Medication Sig Dispense Refill     simvastatin (ZOCOR) 40 MG tablet Take 1 tablet (40 mg) by mouth At Bedtime 90 tablet 3     levothyroxine (SYNTHROID/LEVOTHROID) 137 MCG tablet Take 1 tablet (137 mcg) by mouth daily 90 tablet 3     Omega-3 Fatty Acids (OMEGA-3 FISH OIL PO)        ASPIRIN PO Take 81 mg by mouth daily       OTC products: Aspirin    No Known Allergies   Latex Allergy: NO    Social History   Substance Use Topics     Smoking status: Never Smoker     Smokeless tobacco: Former User      Comment: Years ago     Alcohol use Not on file     History   Drug Use Not on file        REVIEW OF SYSTEMS:                                                    C: NEGATIVE for fever, chills, change in weight  I: NEGATIVE for worrisome rashes, moles or lesions  EYES: POSITIVE for blurred vision bilateral  E/M: NEGATIVE for ear, mouth and throat problems  R: NEGATIVE for significant cough or SOB  B: NEGATIVE for masses, tenderness or discharge  CV: NEGATIVE for chest pain, palpitations or peripheral edema  GI: NEGATIVE for nausea, abdominal pain, heartburn, or change in bowel habits  : NEGATIVE for frequency, dysuria, or hematuria  M: NEGATIVE for significant arthralgias or myalgia  N: NEGATIVE for weakness, dizziness or paresthesias  E: NEGATIVE for temperature intolerance, skin/hair changes  H: NEGATIVE for bleeding problems  P: NEGATIVE for changes in mood or affect    EXAM:                                                    There were no vitals taken for this visit.    GENERAL APPEARANCE: healthy, alert and no distress     EYES: bilateral cataracts Lt >> Rt     HENT: ear canals and TM's normal and nose and mouth without ulcers or lesions     NECK: no adenopathy, no asymmetry, masses, or scars and thyroid normal to palpation     RESP: lungs clear to auscultation - no rales, rhonchi or wheezes     CV: regular rates and rhythm, normal S1 S2, no S3 or S4 and no murmur, click or rub     ABDOMEN:  soft, nontender, no HSM or masses and bowel sounds normal     MS: extremities normal- no gross deformities noted, no evidence of inflammation in joints, FROM in all extremities.     SKIN: no suspicious lesions or rashes     NEURO: Normal strength and tone, sensory exam grossly normal, mentation intact and speech normal     PSYCH: mentation appears normal. and affect normal/bright     LYMPHATICS: No axillary, cervical, or supraclavicular nodes    DIAGNOSTICS:                                                    EKG: appears normal, NSR, normal axis, normal intervals, no acute ST/T changes c/w ischemia, no LVH  by voltage criteria    Recent Labs   Lab Test  10/31/17   0731  02/17/17   0723  12/28/15   0745   HGB  15.4  14.9  14.6   PLT   --   226  196   INR  1.03   --    --    NA  139  140  139   POTASSIUM  4.1  4.4  4.5   CR  0.96  1.01  0.92   A1C  5.7  5.5  5.7        IMPRESSION:                                                    Reason for surgery/procedure: cataracts  bilaterally  Diagnosis/reason for consult: hyperlipdemia    The proposed surgical procedure is considered LOW risk.    REVISED CARDIAC RISK INDEX  The patient has the following serious cardiovascular risks for perioperative complications such as (MI, PE, VFib and 3  AV Block):  No serious cardiac risks  INTERPRETATION: 0 risks: Class I (very low risk - 0.4% complication rate)    The patient has the following additional risks for perioperative complications:  No identified additional risks      ICD-10-CM    1. Preop general physical exam Z01.818 EKG 12-lead complete w/read - Clinics   2. Other specified hypothyroidism E03.8 levothyroxine (SYNTHROID/LEVOTHROID) 150 MCG tablet     DISCONTINUED: levothyroxine (SYNTHROID/LEVOTHROID) 125 MCG tablet       RECOMMENDATIONS:                                                          --Patient is to take all scheduled medications on the day of surgery.    APPROVAL GIVEN to proceed with proposed procedure, without further diagnostic evaluation       Signed Electronically by: Oneal Whitehead MD    Copy of this evaluation report is provided to requesting physician.    [facility] Preop Guidelines

## 2017-11-29 ENCOUNTER — HOSPITAL ENCOUNTER (OUTPATIENT)
Facility: CLINIC | Age: 62
Discharge: HOME | End: 2017-11-29
Attending: OPHTHALMOLOGY | Admitting: OPHTHALMOLOGY
Payer: COMMERCIAL

## 2017-11-29 ENCOUNTER — ANESTHESIA (OUTPATIENT)
Dept: SURGERY | Facility: CLINIC | Age: 62
End: 2017-11-29
Payer: COMMERCIAL

## 2017-11-29 ENCOUNTER — ANESTHESIA EVENT (OUTPATIENT)
Dept: SURGERY | Facility: CLINIC | Age: 62
End: 2017-11-29
Payer: COMMERCIAL

## 2017-11-29 ENCOUNTER — SURGERY (OUTPATIENT)
Age: 62
End: 2017-11-29

## 2017-11-29 VITALS
DIASTOLIC BLOOD PRESSURE: 72 MMHG | TEMPERATURE: 97 F | WEIGHT: 293 LBS | OXYGEN SATURATION: 96 % | BODY MASS INDEX: 41.95 KG/M2 | HEART RATE: 79 BPM | SYSTOLIC BLOOD PRESSURE: 125 MMHG | RESPIRATION RATE: 16 BRPM | HEIGHT: 70 IN

## 2017-11-29 PROCEDURE — 40000170 ZZH STATISTIC PRE-PROCEDURE ASSESSMENT II: Performed by: OPHTHALMOLOGY

## 2017-11-29 PROCEDURE — 27210995 ZZH RX 272: Performed by: OPHTHALMOLOGY

## 2017-11-29 PROCEDURE — 36000105 ZZH EYE SURGERY LEVEL 4 EA 15 ADDTL MIN: Performed by: OPHTHALMOLOGY

## 2017-11-29 PROCEDURE — 25000125 ZZHC RX 250: Performed by: OPHTHALMOLOGY

## 2017-11-29 PROCEDURE — V2632 POST CHMBR INTRAOCULAR LENS: HCPCS | Performed by: OPHTHALMOLOGY

## 2017-11-29 PROCEDURE — 25000125 ZZHC RX 250: Performed by: ANESTHESIOLOGY

## 2017-11-29 PROCEDURE — 37000008 ZZH ANESTHESIA TECHNICAL FEE, 1ST 30 MIN: Performed by: OPHTHALMOLOGY

## 2017-11-29 PROCEDURE — 36000104 ZZH EYE SURGERY LEVEL 4 1ST 30 MIN: Performed by: OPHTHALMOLOGY

## 2017-11-29 PROCEDURE — S0020 INJECTION, BUPIVICAINE HYDRO: HCPCS | Performed by: OPHTHALMOLOGY

## 2017-11-29 PROCEDURE — 25000128 H RX IP 250 OP 636: Performed by: OPHTHALMOLOGY

## 2017-11-29 PROCEDURE — 25000128 H RX IP 250 OP 636: Performed by: NURSE ANESTHETIST, CERTIFIED REGISTERED

## 2017-11-29 PROCEDURE — 25000128 H RX IP 250 OP 636: Performed by: ANESTHESIOLOGY

## 2017-11-29 PROCEDURE — 37000009 ZZH ANESTHESIA TECHNICAL FEE, EACH ADDTL 15 MIN: Performed by: OPHTHALMOLOGY

## 2017-11-29 PROCEDURE — 25000125 ZZHC RX 250: Performed by: NURSE ANESTHETIST, CERTIFIED REGISTERED

## 2017-11-29 PROCEDURE — 71000028 ZZH EYE RECOVERY PHASE 2 EACH 15 MINS: Performed by: OPHTHALMOLOGY

## 2017-11-29 PROCEDURE — 27210794 ZZH OR GENERAL SUPPLY STERILE: Performed by: OPHTHALMOLOGY

## 2017-11-29 DEVICE — IMPLANTABLE DEVICE: Type: IMPLANTABLE DEVICE | Site: EYE | Status: FUNCTIONAL

## 2017-11-29 RX ORDER — PHENYLEPHRINE HYDROCHLORIDE 100 MG/ML
1 SOLUTION/ DROPS OPHTHALMIC ONCE
Status: COMPLETED | OUTPATIENT
Start: 2017-11-29 | End: 2017-11-29

## 2017-11-29 RX ORDER — ONDANSETRON 2 MG/ML
INJECTION INTRAMUSCULAR; INTRAVENOUS PRN
Status: DISCONTINUED | OUTPATIENT
Start: 2017-11-29 | End: 2017-11-29

## 2017-11-29 RX ORDER — LIDOCAINE HYDROCHLORIDE 10 MG/ML
INJECTION, SOLUTION EPIDURAL; INFILTRATION; INTRACAUDAL; PERINEURAL PRN
Status: DISCONTINUED | OUTPATIENT
Start: 2017-11-29 | End: 2017-11-29 | Stop reason: HOSPADM

## 2017-11-29 RX ORDER — PROPARACAINE HYDROCHLORIDE 5 MG/ML
1 SOLUTION/ DROPS OPHTHALMIC ONCE
Status: COMPLETED | OUTPATIENT
Start: 2017-11-29 | End: 2017-11-29

## 2017-11-29 RX ORDER — BUPIVACAINE HYDROCHLORIDE 7.5 MG/ML
INJECTION, SOLUTION EPIDURAL; RETROBULBAR PRN
Status: DISCONTINUED | OUTPATIENT
Start: 2017-11-29 | End: 2017-11-29 | Stop reason: HOSPADM

## 2017-11-29 RX ORDER — PHENYLEPHRINE HYDROCHLORIDE 25 MG/ML
1 SOLUTION/ DROPS OPHTHALMIC
Status: COMPLETED | OUTPATIENT
Start: 2017-11-29 | End: 2017-11-29

## 2017-11-29 RX ORDER — TETRACAINE HYDROCHLORIDE 5 MG/ML
SOLUTION OPHTHALMIC PRN
Status: DISCONTINUED | OUTPATIENT
Start: 2017-11-29 | End: 2017-11-29 | Stop reason: HOSPADM

## 2017-11-29 RX ORDER — SODIUM CHLORIDE, SODIUM LACTATE, POTASSIUM CHLORIDE, CALCIUM CHLORIDE 600; 310; 30; 20 MG/100ML; MG/100ML; MG/100ML; MG/100ML
500 INJECTION, SOLUTION INTRAVENOUS CONTINUOUS
Status: DISCONTINUED | OUTPATIENT
Start: 2017-11-29 | End: 2017-11-29 | Stop reason: HOSPADM

## 2017-11-29 RX ORDER — MOXIFLOXACIN 5 MG/ML
1 SOLUTION/ DROPS OPHTHALMIC
Status: COMPLETED | OUTPATIENT
Start: 2017-11-29 | End: 2017-11-29

## 2017-11-29 RX ORDER — DICLOFENAC SODIUM 1 MG/ML
1 SOLUTION/ DROPS OPHTHALMIC
Status: COMPLETED | OUTPATIENT
Start: 2017-11-29 | End: 2017-11-29

## 2017-11-29 RX ORDER — TROPICAMIDE 10 MG/ML
1 SOLUTION/ DROPS OPHTHALMIC
Status: COMPLETED | OUTPATIENT
Start: 2017-11-29 | End: 2017-11-29

## 2017-11-29 RX ORDER — BALANCED SALT SOLUTION 6.4; .75; .48; .3; 3.9; 1.7 MG/ML; MG/ML; MG/ML; MG/ML; MG/ML; MG/ML
SOLUTION OPHTHALMIC PRN
Status: DISCONTINUED | OUTPATIENT
Start: 2017-11-29 | End: 2017-11-29 | Stop reason: HOSPADM

## 2017-11-29 RX ORDER — DEXAMETHASONE SODIUM PHOSPHATE 10 MG/ML
INJECTION, SOLUTION INTRAMUSCULAR; INTRAVENOUS PRN
Status: DISCONTINUED | OUTPATIENT
Start: 2017-11-29 | End: 2017-11-29 | Stop reason: HOSPADM

## 2017-11-29 RX ORDER — PROPARACAINE HYDROCHLORIDE 5 MG/ML
1 SOLUTION/ DROPS OPHTHALMIC ONCE
Status: DISCONTINUED | OUTPATIENT
Start: 2017-11-29 | End: 2017-11-29 | Stop reason: HOSPADM

## 2017-11-29 RX ADMIN — Medication 1 ML: at 09:39

## 2017-11-29 RX ADMIN — DEXMEDETOMIDINE HYDROCHLORIDE 4 MCG: 100 INJECTION, SOLUTION INTRAVENOUS at 08:25

## 2017-11-29 RX ADMIN — MOXIFLOXACIN 1 DROP: 5 SOLUTION/ DROPS OPHTHALMIC at 07:25

## 2017-11-29 RX ADMIN — DEXAMETHASONE SODIUM PHOSPHATE 5 MG: 10 INJECTION, SOLUTION INTRAMUSCULAR; INTRAVENOUS at 09:39

## 2017-11-29 RX ADMIN — DICLOFENAC SODIUM 1 DROP: 1 SOLUTION OPHTHALMIC at 07:14

## 2017-11-29 RX ADMIN — TETRACAINE HYDROCHLORIDE 2 DROP: 5 SOLUTION OPHTHALMIC at 09:17

## 2017-11-29 RX ADMIN — LIDOCAINE HYDROCHLORIDE 0.5 ML: 35 GEL OPHTHALMIC at 08:15

## 2017-11-29 RX ADMIN — TROPICAMIDE 1 DROP: 10 SOLUTION/ DROPS OPHTHALMIC at 07:14

## 2017-11-29 RX ADMIN — BUPIVACAINE HYDROCHLORIDE 0.5 ML: 7.5 INJECTION, SOLUTION EPIDURAL; RETROBULBAR at 09:11

## 2017-11-29 RX ADMIN — SODIUM CHLORIDE, POTASSIUM CHLORIDE, SODIUM LACTATE AND CALCIUM CHLORIDE 500 ML: 600; 310; 30; 20 INJECTION, SOLUTION INTRAVENOUS at 07:36

## 2017-11-29 RX ADMIN — MIDAZOLAM HYDROCHLORIDE 2 MG: 1 INJECTION, SOLUTION INTRAMUSCULAR; INTRAVENOUS at 08:10

## 2017-11-29 RX ADMIN — BALANCED SALT SOLUTION 15 ML: 6.4; .75; .48; .3; 3.9; 1.7 SOLUTION OPHTHALMIC at 08:18

## 2017-11-29 RX ADMIN — EPINEPHRINE 500 ML: 1 INJECTION, SOLUTION, CONCENTRATE INTRAVENOUS at 08:18

## 2017-11-29 RX ADMIN — PROPARACAINE HYDROCHLORIDE 1 DROP: 5 SOLUTION/ DROPS OPHTHALMIC at 07:14

## 2017-11-29 RX ADMIN — TROPICAMIDE 1 DROP: 10 SOLUTION/ DROPS OPHTHALMIC at 07:20

## 2017-11-29 RX ADMIN — LIDOCAINE HYDROCHLORIDE 1 ML: 10 INJECTION, SOLUTION EPIDURAL; INFILTRATION; INTRACAUDAL; PERINEURAL at 08:19

## 2017-11-29 RX ADMIN — MIDAZOLAM HYDROCHLORIDE 1 MG: 1 INJECTION, SOLUTION INTRAMUSCULAR; INTRAVENOUS at 08:36

## 2017-11-29 RX ADMIN — MOXIFLOXACIN 1 DROP: 5 SOLUTION/ DROPS OPHTHALMIC at 07:14

## 2017-11-29 RX ADMIN — WATER 0.5 ML: 1 INJECTION INTRAMUSCULAR; INTRAVENOUS; SUBCUTANEOUS at 09:40

## 2017-11-29 RX ADMIN — LIDOCAINE HYDROCHLORIDE 1 ML: 10 INJECTION, SOLUTION EPIDURAL; INFILTRATION; INTRACAUDAL; PERINEURAL at 07:36

## 2017-11-29 RX ADMIN — PHENYLEPHRINE HYDROCHLORIDE 1 DROP: 2.5 SOLUTION/ DROPS OPHTHALMIC at 07:20

## 2017-11-29 RX ADMIN — LIDOCAINE HYDROCHLORIDE 2.75 ML: 20 INJECTION, SOLUTION EPIDURAL; INFILTRATION; INTRACAUDAL; PERINEURAL at 09:11

## 2017-11-29 RX ADMIN — DEXMEDETOMIDINE HYDROCHLORIDE 8 MCG: 100 INJECTION, SOLUTION INTRAVENOUS at 08:20

## 2017-11-29 RX ADMIN — ONDANSETRON 4 MG: 2 INJECTION INTRAMUSCULAR; INTRAVENOUS at 08:10

## 2017-11-29 RX ADMIN — DEXMEDETOMIDINE HYDROCHLORIDE 8 MCG: 100 INJECTION, SOLUTION INTRAVENOUS at 08:13

## 2017-11-29 RX ADMIN — SODIUM CHONDROITIN SULFATE / SODIUM HYALURONATE 1 ML: 0.55-0.5 INJECTION INTRAOCULAR at 08:21

## 2017-11-29 RX ADMIN — PHENYLEPHRINE HYDROCHLORIDE 1 DROP: 2.5 SOLUTION/ DROPS OPHTHALMIC at 07:14

## 2017-11-29 RX ADMIN — HYPROMELLOSE 2910 (4000 MPA.S) 2 DROP: 25 SOLUTION/ DROPS OPHTHALMIC at 09:12

## 2017-11-29 RX ADMIN — TROPICAMIDE 1 DROP: 10 SOLUTION/ DROPS OPHTHALMIC at 07:25

## 2017-11-29 RX ADMIN — DICLOFENAC SODIUM 1 DROP: 1 SOLUTION OPHTHALMIC at 07:25

## 2017-11-29 RX ADMIN — DICLOFENAC SODIUM 1 DROP: 1 SOLUTION OPHTHALMIC at 07:20

## 2017-11-29 RX ADMIN — PHENYLEPHRINE HYDROCHLORIDE 1 DROP: 2.5 SOLUTION/ DROPS OPHTHALMIC at 07:25

## 2017-11-29 RX ADMIN — MOXIFLOXACIN 1 DROP: 5 SOLUTION/ DROPS OPHTHALMIC at 07:20

## 2017-11-29 RX ADMIN — PHENYLEPHRINE HYDROCHLORIDE 1 DROP: 100 SOLUTION/ DROPS OPHTHALMIC at 07:48

## 2017-11-29 ASSESSMENT — LIFESTYLE VARIABLES: TOBACCO_USE: 0

## 2017-11-29 NOTE — OP NOTE
DATE OF PROCEDURE:  11/29/2017       PREOPERATIVE DIAGNOSES:    1.  Cataract, left eye.   2.  Retained lens fragments, left eye.      POSTOPERATIVE DIAGNOSES:     1.  Cataract, left eye.   2.  Retained lens fragments, left eye.      PROCEDURE:  23 gauge pars plana vitrectomy, pars plana lensectomy with fragmatome, left eye.      ASSISTANT:  AVIVA Peacock       ANESTHESIA:  Local with monitored anesthesia care.      ESTIMATED BLOOD LOSS:  Minimum.      SPECIMENS:  None.      COMPLICATIONS:  None.      INDICATIONS:  Daniel Villanueva underwent attempted cataract extraction with phacoemulsification to the left eye earlier today.  At the time of surgery, there was a rupture in the posterior capsule, and the cataract surgery was aborted.  Additional surgery to remove the residual cataract material was offered so that a lens implant could subsequently be placed.  Additional surgery was offered in an urgent manner so as to best address the active issues.      DESCRIPTION OF PROCEDURE:  The patient was taken to the operating room and placed in the supine position.  The left eye was prepped and draped in the usual sterile fashion for ophthalmic surgery and a lid speculum was placed.  Topical anesthetic was applied to the eye.  The conjunctiva was opened in the inferotemporal quadrant with blunt Pancho scissors.  A blunt cannula was used to deliver a block behind the globe equator.  The entry to entry site was then closed with 6-0 plain.  The conjunctiva was opened at the limbus in the superotemporal quadrant.  The eye was then opened for standard 23 gauge pars plana vitrectomy.  The patient had a Malyugin ring in place to open the iris.  The anterior capsulorrhexis was intact, and most of the cataract was still present in the bag.  The BIOM was positioned and a thorough vitrectomy was performed.  Dilute triamcinolone was injected into the vitreous cavity to highlight the vitreous.  A posterior vitreous separation was  created at the optic disk, and the vitreous was trimmed into the periphery.  Attention was then turned to the cataract fragments.  The superotemporal sclerotomy was enlarged with a 20 gauge MVR blade.  The fragmatome was introduced and a pars plana lensectomy was performed.  The anterior capsule was left intact.  The BIOM was then repositioned and the retinal periphery was inspected with scleral depression.  The retina was fully attached and no retinal tears were found.  There was negligible residual cataract fragments present.  The superotemporal sclerotomy site was closed with 7-0 Vicryl.  The overlying conjunctiva was closed with 6-0 plain.  The inferotemporal and superonasal sclerotomy sites were closed with 8-0 Vicryl.  The eye was left at physiologic pressure.  The surgery was then turned over to Dr. Mo Duncan for completion of the case.  It was anticipated that he would place an intraocular lens in the sulcus space.  Please see his separate note for further details.         AMBER JACQUES MD             D: 2017 09:41   T: 2017 10:33   MT: [id number]      Name:     ALEIDA DONATO   MRN:      [id number]        Account:        [id number]   :      1955           Procedure Date: 2017      Document: [id number]

## 2017-11-29 NOTE — BRIEF OP NOTE
[facility] Brief Operative Note    Pre-operative diagnosis: Cataract, retained lens fragments, left eye   Post-operative diagnosis Cataract, retained lens fragments, left eye   Procedure: Procedure(s):  COMPLEX LEFT ATTEMPTED PHACOEMULSIFICATION CLEAR CORNEA   - Wound Class: I-Clean  23 GAUGE VITRECTOMY AND LENSECTOMY - Wound Class: I-Clean   Surgeon(s): Surgeon(s) and Role:  Panel 1:     * Mo Duncan MD - Primary     * Jason Nunes MD - Assisting    Panel 2:     * Jason Nunes MD - Primary   Estimated blood loss: * No values recorded between 11/29/2017  8:16 AM and 11/29/2017  9:35 AM *    Specimens: * No specimens in log *   Findings: As above

## 2017-11-29 NOTE — IP AVS SNAPSHOT
[facility]    [address]    [place] [address]    Phone:  [phone]    Fax:  [phone]                                       After Visit Summary   11/29/2017    Daniel Villanueva    MRN: [id number]           After Visit Summary Signature Page     I have received my discharge instructions, and my questions have been answered. I have discussed any challenges I see with this plan with the nurse or doctor.    ..........................................................................................................................................  Patient/Patient Representative Signature      ..........................................................................................................................................  Patient Representative Print Name and Relationship to Patient    ..................................................               ................................................  Date                                            Time    ..........................................................................................................................................  Reviewed by Signature/Title    ...................................................              ..............................................  Date                                                            Time

## 2017-11-29 NOTE — ANESTHESIA PREPROCEDURE EVALUATION
Anesthesia Evaluation     .             ROS/MED HX    ENT/Pulmonary:     (+)NAOMI risk factors snores loudly, obese, Past use , . .   (-) tobacco use and sleep apnea   Neurologic:       Cardiovascular:     (+) Dyslipidemia, ----. : . . . :. .       METS/Exercise Tolerance:     Hematologic:         Musculoskeletal:         GI/Hepatic:         Renal/Genitourinary:         Endo:     (+) thyroid problem hypothyroidism, Obesity, .      Psychiatric:         Infectious Disease:         Malignancy:         Other:                     Physical Exam  Normal systems: cardiovascular, pulmonary and dental    Airway   Mallampati: III  TM distance: >3 FB  Neck ROM: full    Dental     Cardiovascular       Pulmonary                     Anesthesia Plan      History & Physical Review  History and physical reviewed and following examination; no interval change.    ASA Status:  2 .    NPO Status:  > 8 hours    Plan for MAC Reason for MAC:  Procedure to face, neck, head or breast         Postoperative Care      Consents  Anesthetic plan, risks, benefits and alternatives discussed with:  Patient..                          .

## 2017-11-29 NOTE — DISCHARGE INSTRUCTIONS
POST-OPERATIVE CARE FOLLOWING CATARACT SURGERY    DR. NELI LOMELI  [facility]  [phone]    Postoperative medications: After surgery, you will use several different eye drop medications. You may have either the brand specific form or generic of each type used.     Begin your eye drops today as directed.  You should instill the drop, close the eye without blinking and keep closed for 3 minutes allowing the drop to absorb.  It is fine to instill all 3 of the drops consecutively, waiting the 3 minutes in between each one. Place the shield for sleep.  In the morning, instill 1 drop of all 3 eye drops before your post-op appointment.      Antibiotic  Moxeza - is an antibiotic drop that is used to minimize the risk of infection. Instill your first drop at bedtime tonight, then 2 times daily for one week.    Anti-inflammatory   Prolensa - use it once daily until gone, beginning today.     Steroid  Durezol - is a steroid drop used to minimize inflammation and modulate healing.  It should be used 2 times daily until gone, beginning with your first drop at bedtime tonight.        Do not rub the operated eye. Wear the eye shield during sleeping hours for one week.      Light sensitivity may be noticed. Sunglasses may be worn for comfort.      Some discomfort and irritation may be noticed. Acetaminophen (Tylenol) or Ibuprofen (Advil) may be taken for discomfort.      Avoid bending over, strenuous activity or heavy lifting for one week.      Keep the operated eye dry.      You may wash your hair, bathe or shower, but keep the operated eye closed while doing so.      Use medication exactly as prescribed by your doctor.  You may restart your regular home medications.      Bring all materials and medications to the clinic on your first post-operative visit.      Call the doctor s office if any of the following should occur:  -  any sudden vision change  -  nausea or severe headache  -  increase in pain not  controlled  -  increased amount of floaters (black spots in front of vision)         -  or signs of infection (pus, increasing redness or tenderness)    Revised 12/10/2015    [facility] Discharge  Instructions  Anesthesia (Eye Care Center)   Adult Discharge Instructions    For 24 hours after surgery    1. Get plenty of rest.  Make arrangements to have a responsible adult stay with you for at least 6 hours after you leave the hospital.  2. Do not drive or use heavy equipment for 24 hours.    3. Do not drink alcohol for 24 hours.  4. Do not sign legal documents or make important decisions for 24 hours.  5. Avoid strenuous or risky activities. You may feel lightheaded.  If so, sit for a few minutes before standing.  Have someone help you get up.   6. Conscious sedation patients may resume a regular diet..  7. Any questions of medical nature, call your physician.

## 2017-11-29 NOTE — ANESTHESIA POSTPROCEDURE EVALUATION
Patient: Daniel Villanueva    Procedure(s):  COMPLEX LEFT ATTEMPTED PHACOEMULSIFICATION CLEAR CORNEA   - Wound Class: I-Clean  23 GAUGE VITRECTOMY AND LENSECTOMY - Wound Class: I-Clean    Diagnosis:cataract  Diagnosis Additional Information: No value filed.    Anesthesia Type:  MAC    Note:  Anesthesia Post Evaluation    Patient location during evaluation: PACU  Patient participation: Able to fully participate in evaluation  Level of consciousness: awake and alert  Pain management: adequate  Airway patency: patent  Cardiovascular status: acceptable  Respiratory status: acceptable  Hydration status: acceptable  PONV: none     Anesthetic complications: None          Last vitals:  Vitals:    11/29/17 0728 11/29/17 0951 11/29/17 1017   BP: (!) 137/92 131/82 125/72   Pulse: 79     Resp:  16 16   Temp: 36.1  C (97  F)     SpO2:  96% 96%         Electronically Signed By: Jason Wray MD  November 29, 2017  2:05 PM

## 2017-11-29 NOTE — OP NOTE
[facility]  Ophthalmology Operative Note    PREOPERATIVE DIAGNOSIS:  Dense cataract of the Left  Left eye.       POSTOPERATIVE DIAGNOSIS:  Dense cataract of the Left  Left eye.       OPERATION:  Clear corneal phacoemulsification with intraocular lens implant of the Left  Left eye.       PROCEDURE:  Daniel Villanueva was brought into the operating room with a topical anesthetic.  Under the microscope after a sterile ophthalmic prep, a lid speculum was put into place.  Anterior chamber was entered with a #75 blade.  Anterior chamber was then filled with lidocaine solution and a viscoelastic material.  A keratome blade was then used to fashion a 2.6 mm temporal incision in the corneoscleral junction, a Malugyin ring was positioned to expand the pupil  and anterior capsule of the lens was opened using a circular capsulorrhexis.  The lens was carefully hydrodissected.  The phacoemulsification tip was then introduced into the eye and a central groove fashioned.  At this point, the patient moved and the posterior capsule was breached inferionasally.  It was then decided to interrupt the procedure and ask Dr. Jason Nunes to consult regarding the best way to proceed.  It was agreed that a posterior approach to complete the lensectomy would be best.  The patient was then moved to OR  and Dr. Nunes proceeded with a vitrectomy and pars plan approach to removing the remaining lens.  The anterior capsule remained intact during the lensectomy providing the ability to insert a sulcus fixation IOL.  This was accomplished and the lens centered nicely.  The Malyugin ring was removed carefully  .  Remaining viscoelastic was cleared.  The patient tolerated the procedure without difficulty.       Implant Name Type Inv. Item Serial No.  Lot No. LRB No. Used   EYE IMP IOL AVELINA PCL TECNIS [id number] 17.0 Lens/Eye Implant EYE IMP IOL AVELINA PCL TECNIS [id number] 17.0 [id number] [employer]   Left 1           NELI  FRANK LOMELI MD

## 2017-11-29 NOTE — IP AVS SNAPSHOT
MRN:[id number]                      After Visit Summary   11/29/2017    Daniel Villanueva    MRN: [id number]           Thank you!     Thank you for choosing [facility] for your care. Our goal is always to provide you with excellent care. Hearing back from our patients is one way we can continue to improve our services. Please take a few minutes to complete the written survey that you may receive in the mail after you visit with us. Thank you!        Patient Information     Date Of Birth          1955        About your hospital stay     You were admitted on:  November 29, 2017 You last received care in the:  [facility]    You were discharged on:  November 29, 2017       Who to Call     For medical emergencies, please call 911.  For non-urgent questions about your medical care, please call your primary care provider or clinic, [phone]  For questions related to your surgery, please call your surgery clinic        Attending Provider     Provider Specialty    Neli Lomeli MD Ophthalmology       Primary Care Provider Office Phone # Fax #    Candelariolisa Edmar Whitehead [phone]      Your next 10 appointments already scheduled     Dec 13, 2017   Procedure with Neli Lomeli MD   [facility] (--)    [address] [place] [address] [address], [address]  [facility] [address] [phone]              Further instructions from your care team           POST-OPERATIVE CARE FOLLOWING CATARACT SURGERY    DR. NELI LOMELI  [facility]  [phone]    Postoperative medications: After surgery, you will use several different eye drop medications. You may have either the brand specific form or generic of each type used.     Begin your eye drops today as directed.  You should instill the drop, close the eye without blinking and keep closed for 3 minutes allowing the drop to absorb.  It is fine to instill all 3 of the drops consecutively, waiting the 3 minutes in  between each one. Place the shield for sleep.  In the morning, instill 1 drop of all 3 eye drops before your post-op appointment.      Antibiotic  Moxeza - is an antibiotic drop that is used to minimize the risk of infection. Instill your first drop at bedtime tonight, then 2 times daily for one week.    Anti-inflammatory   Prolensa - use it once daily until gone, beginning today.     Steroid  Durezol - is a steroid drop used to minimize inflammation and modulate healing.  It should be used 2 times daily until gone, beginning with your first drop at bedtime tonight.        Do not rub the operated eye. Wear the eye shield during sleeping hours for one week.      Light sensitivity may be noticed. Sunglasses may be worn for comfort.      Some discomfort and irritation may be noticed. Acetaminophen (Tylenol) or Ibuprofen (Advil) may be taken for discomfort.      Avoid bending over, strenuous activity or heavy lifting for one week.      Keep the operated eye dry.      You may wash your hair, bathe or shower, but keep the operated eye closed while doing so.      Use medication exactly as prescribed by your doctor.  You may restart your regular home medications.      Bring all materials and medications to the clinic on your first post-operative visit.      Call the doctor s office if any of the following should occur:  -  any sudden vision change  -  nausea or severe headache  -  increase in pain not controlled  -  increased amount of floaters (black spots in front of vision)         -  or signs of infection (pus, increasing redness or tenderness)    Revised 12/10/2015    [facility] Anesthesia [facility] Care [facility] Discharge  Instructions  Anesthesia (Eye Care Center)   Adult Discharge Instructions    For 24 hours after surgery    1. Get plenty of rest.  Make arrangements to have a responsible adult stay with you for at least 6 hours after you leave the hospital.  2. Do not drive or use heavy equipment for 24 hours.   "  3. Do not drink alcohol for 24 hours.  4. Do not sign legal documents or make important decisions for 24 hours.  5. Avoid strenuous or risky activities. You may feel lightheaded.  If so, sit for a few minutes before standing.  Have someone help you get up.   6. Conscious sedation patients may resume a regular diet..  7. Any questions of medical nature, call your physician.    Pending Results     No orders found from 11/27/2017 to 11/30/2017.            Admission Information     Date & Time Provider Department Dept. Phone    11/29/2017 Mo Duncan MD [facility] [phone]      Your Vitals Were     Blood Pressure Pulse Temperature Respirations Height Weight    137/92 79 97  F (36.1  C) (Temporal) 18 1.778 m (5' 10\") 132.9 kg (293 lb)    Pulse Oximetry BMI (Body Mass Index)                95% 42.04 kg/m2          MyChart Information     [employer] gives you secure access to your electronic health record. If you see a primary care provider, you can also send messages to your care team and make appointments. If you have questions, please call your primary care clinic.  If you do not have a primary care provider, please call [phone] and they will assist you.        Care EveryWhere ID     This is your Care EveryWhere ID. This could be used by other organizations to access your [facility] medical records  [id number]        Equal Access to Services     SHANTEL CAT AH: Hadjeni rodríguez Sojourdan, waaxda luqadaha, qaybta kaalhenry andrews. So [facility] [phone].    ATENCIÓN: Si habla español, tiene a barrera disposición servicios gratuitos de asistencia lingüística. Llame al [phone].    We comply with applicable federal civil rights laws and [place] laws. We do not discriminate on the basis of race, color, national origin, age, disability, sex, sexual orientation, or gender identity.               Review of your medicines      UNREVIEWED medicines. " Ask your doctor about these medicines        Dose / Directions    ASPIRIN PO        Dose:  81 mg   Take 81 mg by mouth daily   Refills:  0       levothyroxine 150 MCG tablet   Commonly known as:  SYNTHROID/LEVOTHROID   Used for:  Other specified hypothyroidism        Dose:  150 mcg   Take 1 tablet (150 mcg) by mouth daily   Quantity:  90 tablet   Refills:  3       OMEGA-3 FISH OIL PO        Refills:  0       simvastatin 40 MG tablet   Commonly known as:  ZOCOR   Used for:  Hyperlipidemia LDL goal <130        Dose:  40 mg   Take 1 tablet (40 mg) by mouth At Bedtime   Quantity:  90 tablet   Refills:  3                Protect others around you: Learn how to safely use, store and throw away your medicines at www.disposemymeds.org.             Medication List: This is a list of all your medications and when to take them. Check marks below indicate your daily home schedule. Keep this list as a reference.      Medications           Morning Afternoon Evening Bedtime As Needed    ASPIRIN PO   Take 81 mg by mouth daily                                levothyroxine 150 MCG tablet   Commonly known as:  SYNTHROID/LEVOTHROID   Take 1 tablet (150 mcg) by mouth daily                                OMEGA-3 FISH OIL PO                                simvastatin 40 MG tablet   Commonly known as:  ZOCOR   Take 1 tablet (40 mg) by mouth At Bedtime

## 2017-12-02 ENCOUNTER — MYC MEDICAL ADVICE (OUTPATIENT)
Dept: OTHER | Facility: CLINIC | Age: 62
End: 2017-12-02

## 2017-12-02 DIAGNOSIS — Z01.818 PRE-OPERATIVE GENERAL PHYSICAL EXAMINATION: Primary | ICD-10-CM

## 2017-12-11 ENCOUNTER — OFFICE VISIT (OUTPATIENT)
Dept: OTHER | Facility: CLINIC | Age: 62
End: 2017-12-11
Attending: INTERNAL MEDICINE
Payer: COMMERCIAL

## 2017-12-11 VITALS
BODY MASS INDEX: 42.37 KG/M2 | SYSTOLIC BLOOD PRESSURE: 128 MMHG | HEART RATE: 86 BPM | DIASTOLIC BLOOD PRESSURE: 78 MMHG | RESPIRATION RATE: 12 BRPM | WEIGHT: 296 LBS | OXYGEN SATURATION: 96 % | HEIGHT: 70 IN

## 2017-12-11 DIAGNOSIS — Z01.818 PRE-OPERATIVE GENERAL PHYSICAL EXAMINATION: ICD-10-CM

## 2017-12-11 DIAGNOSIS — Z01.818 PREOP GENERAL PHYSICAL EXAM: Primary | ICD-10-CM

## 2017-12-11 LAB
APTT PPP: 24 SEC (ref 22–37)
HGB BLD-MCNC: 14 G/DL (ref 13.3–17.7)
INR PPP: 0.92 (ref 0.86–1.14)

## 2017-12-11 PROCEDURE — 85018 HEMOGLOBIN: CPT | Performed by: INTERNAL MEDICINE

## 2017-12-11 PROCEDURE — 93010 ELECTROCARDIOGRAM REPORT: CPT | Mod: ZP | Performed by: INTERNAL MEDICINE

## 2017-12-11 PROCEDURE — 36415 COLL VENOUS BLD VENIPUNCTURE: CPT | Performed by: INTERNAL MEDICINE

## 2017-12-11 PROCEDURE — 99244 OFF/OP CNSLTJ NEW/EST MOD 40: CPT | Mod: ZP | Performed by: INTERNAL MEDICINE

## 2017-12-11 PROCEDURE — 85730 THROMBOPLASTIN TIME PARTIAL: CPT | Performed by: INTERNAL MEDICINE

## 2017-12-11 PROCEDURE — 99212 OFFICE O/P EST SF 10 MIN: CPT | Mod: 25

## 2017-12-11 PROCEDURE — 85610 PROTHROMBIN TIME: CPT | Performed by: INTERNAL MEDICINE

## 2017-12-11 NOTE — MR AVS SNAPSHOT
After Visit Summary   12/11/2017    Daniel Villanueva    MRN: [id number]           Patient Information     Date Of Birth          1955        Visit Information        Provider Department      12/11/2017 3:30 PM Oneal Whitehead MD [facility]      Today's Diagnoses     Preop general physical exam    -  1      Care Instructions      Before Your Surgery      Call your surgeon if there is any change in your health. This includes signs of a cold or flu (such as a sore throat, runny nose, cough, rash or fever).    Do not smoke, drink alcohol or take over the counter medicine (unless your surgeon or primary care doctor tells you to) for the 24 hours before and after surgery.    If you take prescribed drugs: Follow your doctor s orders about which medicines to take and which to stop until after surgery.    Eating and drinking prior to surgery: follow the instructions from your surgeon    Take a shower or bath the night before surgery. Use the soap your surgeon gave you to gently clean your skin. If you do not have soap from your surgeon, use your regular soap. Do not shave or scrub the surgery site.  Wear clean pajamas and have clean sheets on your bed.           Follow-ups after your visit        Your next 10 appointments already scheduled     Dec 13, 2017   Procedure with Mo Duncan MD   [facility] (--)    [address]  [facility] [id number] [phone]              Who to contact     If you have questions or need follow up information about today's clinic visit or your schedule please contact [facility] directly at [phone].  Normal or non-critical lab and imaging results will be communicated to you by MyChart, letter or phone within 4 business days after the clinic has received the results. If you do not hear from us within 7 days, please contact the  "clinic through [employer] or phone. If you have a critical or abnormal lab result, we will notify you by phone as soon as possible.  Submit refill requests through [employer]wa or call your pharmacy and they will forward the refill request to us. Please allow 3 business days for your refill to be completed.          Additional Information About Your Visit        [employer] Information     [employer] gives you secure access to your electronic health record. If you see a primary care provider, you can also send messages to your care team and make appointments. If you have questions, please call your primary care clinic.  If you do not have a primary care provider, please call [phone] and they will assist you.        Care EveryWhere ID     This is your Care EveryWhere ID. This could be used by other organizations to access your [place] medical records  [id number]        Your Vitals Were     Pulse Respirations Height Pulse Oximetry BMI (Body Mass Index)       86 12 5' 10\" (1.778 m) 96% 42.47 kg/m2        Blood Pressure from Last 3 Encounters:   12/11/17 128/78   11/29/17 125/72   11/07/17 136/79    Weight from Last 3 Encounters:   12/11/17 296 lb (134.3 kg)   11/29/17 293 lb (132.9 kg)   11/07/17 293 lb (132.9 kg)              We Performed the Following     EKG 12-lead complete w/read - Clinics        Primary Care Provider Office Phone # Fax #    Ramyjesus Edmar Whitehead [phone]       [facility] [address]  [place] [address]        Equal Access to Services     SHANTEL CAT : Hadii aad ku hadasho Soomaali, waaxda luqadaha, qaybta kaalmada adeegyada, henry rodriguez. So [facility] [phone].    ATENCIÓN: Si habla español, tiene a barrera disposición servicios gratuitos de asistencia lingüística. Llame al [phone].    We comply with applicable federal civil rights laws and [place] laws. We do not discriminate on the basis of race, color, national origin, age, " disability, sex, sexual orientation, or gender identity.            Thank you!     Thank you for choosing [facility]  for your care. Our goal is always to provide you with excellent care. Hearing back from our patients is one way we can continue to improve our services. Please take a few minutes to complete the written survey that you may receive in the mail after your visit with us. Thank you!             Your Updated Medication List - Protect others around you: Learn how to safely use, store and throw away your medicines at www.disposemymeds.org.          This list is accurate as of: 12/11/17  3:54 PM.  Always use your most recent med list.                   Brand Name Dispense Instructions for use Diagnosis    ASPIRIN PO      Take 81 mg by mouth daily        levothyroxine 150 MCG tablet    SYNTHROID/LEVOTHROID    90 tablet    Take 1 tablet (150 mcg) by mouth daily    Other specified hypothyroidism       OMEGA-3 FISH OIL PO           simvastatin 40 MG tablet    ZOCOR    90 tablet    Take 1 tablet (40 mg) by mouth At Bedtime    Hyperlipidemia LDL goal <130

## 2017-12-11 NOTE — NURSING NOTE
"Chief Complaint   Patient presents with     RECHECK     pre op       Initial /88 (BP Location: Right arm, Patient Position: Chair, Cuff Size: Adult Large)  Pulse 86  Ht 5' 10\" (1.778 m)  Wt 296 lb (134.3 kg)  SpO2 96%  BMI 42.47 kg/m2 Estimated body mass index is 42.47 kg/(m^2) as calculated from the following:    Height as of this encounter: 5' 10\" (1.778 m).    Weight as of this encounter: 296 lb (134.3 kg).  Medication Reconciliation: complete    Face to Face time 12 minutes  Jenna Aggarwal CMA      "

## 2017-12-11 NOTE — PROGRESS NOTES
[facility]  [address]  [place] [address]  [phone]  Dept: [phone]    PRE-OP EVALUATION:  Today's date: 2017    Daniel Villanueva (: 1955) presents for pre-operative evaluation assessment as requested by Dr. Duncan.  He requires evaluation and anesthesia risk assessment prior to undergoing surgery/procedure for treatment of Cataracts .  Proposed procedure: Cataract Repair OD    Date of Surgery/ Procedure: 17  Time of Surgery/ Procedure: 8:30 AM  Hospital/Surgical Facility: [facility]  Primary Physician: Oneal Whitehead  Type of Anesthesia Anticipated: General    Patient has a Health Care Directive or Living Will:  NO    1. NO - Do you have a history of heart attack, stroke, stent, bypass or surgery on an artery in the head, neck, heart or legs?  2. NO - Do you ever have any pain or discomfort in your chest?  3. NO - Do you have a history of  Heart Failure?  4. NO - Are you troubled by shortness of breath when: walking on the level, up a slight hill or at night?  5. NO - Do you currently have a cold, bronchitis or other respiratory infection?  6. NO - Do you have a cough, shortness of breath or wheezing?  7. NO - Do you sometimes get pains in the calves of your legs when you walk?  8. NO - Do you or anyone in your family have previous history of blood clots?  9. NO - Do you or does anyone in your family have a serious bleeding problem such as prolonged bleeding following surgeries or cuts?  10. NO - Have you ever had problems with anemia or been told to take iron pills?  11. NO - Have you had any abnormal blood loss such as black, tarry or bloody stools, or abnormal vaginal bleeding?  12. NO - Have you ever had a blood transfusion?  13. NO - HAVE YOU OR ANY OF YOUR RELATIVES EVER HAD PROBLEMS WITH ANESTHESIA?   14. NO - Do you have sleep apnea, excessive snoring or daytime drowsiness?  15. NO - Do you have any prosthetic heart  valves?  16. NO - Do you have prosthetic joints?  17. NO - Is there any chance that you may be pregnant?        HPI:                                                      Brief HPI related to upcoming procedure: The patient has known cataracts, and recently pursued definitive surgical management of the OS cataract, and is now pursuing elective repair with the OD cataract.      See problem list for active medical problems.  Problems all longstanding and stable, except as noted/documented.  See ROS for pertinent symptoms related to these conditions.                                                                                                  .    MEDICAL HISTORY:                                                    Patient Active Problem List    Diagnosis Date Noted     Hyperlipidemia LDL goal <130 12/19/2012     Priority: Medium     CARDIOVASCULAR SCREENING; LDL GOAL LESS THAN 130 10/31/2010     Priority: Medium     Hypothyroidism 05/14/2007     Priority: Medium     Problem list name updated by automated process. Provider to review        Past Medical History:   Diagnosis Date     Gynecomastia      Hyperlipidemia      Unspecified hypothyroidism      Past Surgical History:   Procedure Laterality Date     COLONOSCOPY       PHACOEMULSIFICATION CLEAR CORNEA WITH STANDARD INTRAOCULAR LENS IMPLANT Left 11/29/2017    Procedure: PHACOEMULSIFICATION CLEAR CORNEA WITH STANDARD INTRAOCULAR LENS IMPLANT;  COMPLEX LEFT ATTEMPTED PHACOEMULSIFICATION CLEAR CORNEA  ;  Surgeon: Mo Duncan MD;  Location: [facility]     VITRECTOMY PARSPLANA, LENSECTOMY, COMBINED Left 11/29/2017    Procedure: COMBINED VITRECTOMY PARSPLANA, LENSECTOMY;  23 GAUGE VITRECTOMY AND LENSECTOMY;  Surgeon: Jason Nunes MD;  Location: [facility]     Current Outpatient Prescriptions   Medication Sig Dispense Refill     levothyroxine (SYNTHROID/LEVOTHROID) 150 MCG tablet Take 1 tablet (150 mcg) by mouth daily 90 tablet 3     simvastatin (ZOCOR) 40 MG tablet  "Take 1 tablet (40 mg) by mouth At Bedtime 90 tablet 3     Omega-3 Fatty Acids (OMEGA-3 FISH OIL PO)        ASPIRIN PO Take 81 mg by mouth daily       OTC products: Aspirin    No Known Allergies   Latex Allergy: NO    Social History   Substance Use Topics     Smoking status: Never Smoker     Smokeless tobacco: Former User      Comment: Years ago     Alcohol use Yes      Comment: rarely     History   Drug Use No       REVIEW OF SYSTEMS:                                                    C: NEGATIVE for fever, chills, change in weight  I: NEGATIVE for worrisome rashes, moles or lesions  EYES: POSITIVE for blurred vision right  E/M: NEGATIVE for ear, mouth and throat problems  R: NEGATIVE for significant cough or SOB  B: NEGATIVE for masses, tenderness or discharge  CV: NEGATIVE for chest pain, palpitations or peripheral edema  GI: NEGATIVE for nausea, abdominal pain, heartburn, or change in bowel habits  : NEGATIVE for frequency, dysuria, or hematuria  M: NEGATIVE for significant arthralgias or myalgia  N: NEGATIVE for weakness, dizziness or paresthesias  E: NEGATIVE for temperature intolerance, skin/hair changes  H: NEGATIVE for bleeding problems  P: NEGATIVE for changes in mood or affect    EXAM:                                                    /78 (BP Location: Right arm, Patient Position: Chair, Cuff Size: Adult Large)  Pulse 86  Resp 12  Ht 5' 10\" (1.778 m)  Wt 296 lb (134.3 kg)  SpO2 96%  BMI 42.47 kg/m2    GENERAL APPEARANCE: healthy, alert and no distress     EYES: S/P IOL implant OS, cataracts OD     HENT: ear canals and TM's normal and nose and mouth without ulcers or lesions     NECK: no adenopathy, no asymmetry, masses, or scars and thyroid normal to palpation     RESP: lungs clear to auscultation - no rales, rhonchi or wheezes     CV: regular rates and rhythm, normal S1 S2, no S3 or S4 and no murmur, click or rub     ABDOMEN:  soft, nontender, no HSM or masses and bowel sounds normal     " MS: extremities normal- no gross deformities noted, no evidence of inflammation in joints, FROM in all extremities.     SKIN: no suspicious lesions or rashes     NEURO: Normal strength and tone, sensory exam grossly normal, mentation intact and speech normal     PSYCH: mentation appears normal. and affect normal/bright     LYMPHATICS: No axillary, cervical, or supraclavicular nodes    DIAGNOSTICS:                                                    EKG: appears normal, NSR, normal axis, normal intervals, no acute ST/T changes c/w ischemia, no LVH by voltage criteria    Recent Labs   Lab Test  12/11/17   0729  10/31/17   0731  02/17/17   0723  12/28/15   0745   HGB  14.0  15.4  14.9  14.6   PLT   --    --   226  196   INR  0.92  1.03   --    --    NA   --   139  140  139   POTASSIUM   --   4.1  4.4  4.5   CR   --   0.96  1.01  0.92   A1C   --   5.7  5.5  5.7        IMPRESSION:                                                    Reason for surgery/procedure: cataracts  Diagnosis/reason for consult: hyperlipidemia    The proposed surgical procedure is considered LOW risk.    REVISED CARDIAC RISK INDEX  The patient has the following serious cardiovascular risks for perioperative complications such as (MI, PE, VFib and 3  AV Block):  No serious cardiac risks  INTERPRETATION: 0 risks: Class I (very low risk - 0.4% complication rate)    The patient has the following additional risks for perioperative complications:  No identified additional risks    No diagnosis found.    RECOMMENDATIONS:                                                      --Consult hospital rounder / IM to assist post-op medical management    --Patient is to take all scheduled medications on the day of surgery EXCEPT for modifications listed below.    Anticoagulant or Antiplatelet Medication Use  ASPIRIN: Bleeding risk is low for this procedure and aspirin 81 mg daily should be continued in the perioperative period          APPROVAL GIVEN to proceed with  proposed procedure, without further diagnostic evaluation       Signed Electronically by: Oneal Whitehead MD    Copy of this evaluation report is provided to requesting physician.    [facility] Preop Guidelines

## 2017-12-13 ENCOUNTER — HOSPITAL ENCOUNTER (OUTPATIENT)
Facility: CLINIC | Age: 62
Discharge: HOME | End: 2017-12-13
Attending: OPHTHALMOLOGY | Admitting: OPHTHALMOLOGY
Payer: COMMERCIAL

## 2017-12-13 ENCOUNTER — ANESTHESIA EVENT (OUTPATIENT)
Dept: SURGERY | Facility: CLINIC | Age: 62
End: 2017-12-13
Payer: COMMERCIAL

## 2017-12-13 ENCOUNTER — ANESTHESIA (OUTPATIENT)
Dept: SURGERY | Facility: CLINIC | Age: 62
End: 2017-12-13
Payer: COMMERCIAL

## 2017-12-13 VITALS
SYSTOLIC BLOOD PRESSURE: 95 MMHG | OXYGEN SATURATION: 94 % | RESPIRATION RATE: 16 BRPM | TEMPERATURE: 96.8 F | HEIGHT: 70 IN | DIASTOLIC BLOOD PRESSURE: 69 MMHG | BODY MASS INDEX: 41.95 KG/M2 | WEIGHT: 293 LBS

## 2017-12-13 PROCEDURE — 25000125 ZZHC RX 250: Performed by: NURSE ANESTHETIST, CERTIFIED REGISTERED

## 2017-12-13 PROCEDURE — 25000125 ZZHC RX 250: Performed by: ANESTHESIOLOGY

## 2017-12-13 PROCEDURE — 27210794 ZZH OR GENERAL SUPPLY STERILE: Performed by: OPHTHALMOLOGY

## 2017-12-13 PROCEDURE — 25000128 H RX IP 250 OP 636: Performed by: NURSE ANESTHETIST, CERTIFIED REGISTERED

## 2017-12-13 PROCEDURE — 25000128 H RX IP 250 OP 636: Performed by: OPHTHALMOLOGY

## 2017-12-13 PROCEDURE — 40000170 ZZH STATISTIC PRE-PROCEDURE ASSESSMENT II: Performed by: OPHTHALMOLOGY

## 2017-12-13 PROCEDURE — 71000028 ZZH EYE RECOVERY PHASE 2 EACH 15 MINS: Performed by: OPHTHALMOLOGY

## 2017-12-13 PROCEDURE — 25000128 H RX IP 250 OP 636: Performed by: ANESTHESIOLOGY

## 2017-12-13 PROCEDURE — 36000101 ZZH EYE SURGERY LEVEL 3 1ST 30 MIN: Performed by: OPHTHALMOLOGY

## 2017-12-13 PROCEDURE — 37000008 ZZH ANESTHESIA TECHNICAL FEE, 1ST 30 MIN: Performed by: OPHTHALMOLOGY

## 2017-12-13 PROCEDURE — V2632 POST CHMBR INTRAOCULAR LENS: HCPCS | Performed by: OPHTHALMOLOGY

## 2017-12-13 PROCEDURE — 25000125 ZZHC RX 250: Performed by: OPHTHALMOLOGY

## 2017-12-13 DEVICE — EYE IMP IOL AMO PCL TECNIS ZCB00 18.5: Type: IMPLANTABLE DEVICE | Site: EYE | Status: FUNCTIONAL

## 2017-12-13 RX ORDER — PROPOFOL 10 MG/ML
INJECTION, EMULSION INTRAVENOUS PRN
Status: DISCONTINUED | OUTPATIENT
Start: 2017-12-13 | End: 2017-12-13

## 2017-12-13 RX ORDER — DICLOFENAC SODIUM 1 MG/ML
1 SOLUTION/ DROPS OPHTHALMIC
Status: COMPLETED | OUTPATIENT
Start: 2017-12-13 | End: 2017-12-13

## 2017-12-13 RX ORDER — PROPARACAINE HYDROCHLORIDE 5 MG/ML
1 SOLUTION/ DROPS OPHTHALMIC ONCE
Status: DISCONTINUED | OUTPATIENT
Start: 2017-12-13 | End: 2017-12-13 | Stop reason: HOSPADM

## 2017-12-13 RX ORDER — TROPICAMIDE 10 MG/ML
1 SOLUTION/ DROPS OPHTHALMIC
Status: COMPLETED | OUTPATIENT
Start: 2017-12-13 | End: 2017-12-13

## 2017-12-13 RX ORDER — LIDOCAINE HYDROCHLORIDE 10 MG/ML
INJECTION, SOLUTION EPIDURAL; INFILTRATION; INTRACAUDAL; PERINEURAL PRN
Status: DISCONTINUED | OUTPATIENT
Start: 2017-12-13 | End: 2017-12-13 | Stop reason: HOSPADM

## 2017-12-13 RX ORDER — PROPARACAINE HYDROCHLORIDE 5 MG/ML
1 SOLUTION/ DROPS OPHTHALMIC ONCE
Status: COMPLETED | OUTPATIENT
Start: 2017-12-13 | End: 2017-12-13

## 2017-12-13 RX ORDER — PHENYLEPHRINE HYDROCHLORIDE 25 MG/ML
1 SOLUTION/ DROPS OPHTHALMIC
Status: COMPLETED | OUTPATIENT
Start: 2017-12-13 | End: 2017-12-13

## 2017-12-13 RX ORDER — BALANCED SALT SOLUTION 6.4; .75; .48; .3; 3.9; 1.7 MG/ML; MG/ML; MG/ML; MG/ML; MG/ML; MG/ML
SOLUTION OPHTHALMIC PRN
Status: DISCONTINUED | OUTPATIENT
Start: 2017-12-13 | End: 2017-12-13 | Stop reason: HOSPADM

## 2017-12-13 RX ORDER — SODIUM CHLORIDE, SODIUM LACTATE, POTASSIUM CHLORIDE, CALCIUM CHLORIDE 600; 310; 30; 20 MG/100ML; MG/100ML; MG/100ML; MG/100ML
INJECTION, SOLUTION INTRAVENOUS CONTINUOUS
Status: DISCONTINUED | OUTPATIENT
Start: 2017-12-13 | End: 2017-12-13 | Stop reason: HOSPADM

## 2017-12-13 RX ORDER — MOXIFLOXACIN 5 MG/ML
1 SOLUTION/ DROPS OPHTHALMIC
Status: COMPLETED | OUTPATIENT
Start: 2017-12-13 | End: 2017-12-13

## 2017-12-13 RX ADMIN — DEXMEDETOMIDINE HYDROCHLORIDE 8 MCG: 100 INJECTION, SOLUTION INTRAVENOUS at 09:05

## 2017-12-13 RX ADMIN — MIDAZOLAM 2 MG: 1 INJECTION INTRAMUSCULAR; INTRAVENOUS at 08:54

## 2017-12-13 RX ADMIN — MOXIFLOXACIN 1 DROP: 5 SOLUTION/ DROPS OPHTHALMIC at 07:13

## 2017-12-13 RX ADMIN — TROPICAMIDE 1 DROP: 10 SOLUTION/ DROPS OPHTHALMIC at 07:18

## 2017-12-13 RX ADMIN — DEXMEDETOMIDINE HYDROCHLORIDE 20 MCG: 100 INJECTION, SOLUTION INTRAVENOUS at 08:54

## 2017-12-13 RX ADMIN — PROPOFOL 10 MG: 10 INJECTION, EMULSION INTRAVENOUS at 09:05

## 2017-12-13 RX ADMIN — PROPOFOL 10 MG: 10 INJECTION, EMULSION INTRAVENOUS at 09:08

## 2017-12-13 RX ADMIN — PHENYLEPHRINE HYDROCHLORIDE 1 DROP: 2.5 SOLUTION/ DROPS OPHTHALMIC at 07:18

## 2017-12-13 RX ADMIN — DICLOFENAC SODIUM 1 DROP: 1 SOLUTION OPHTHALMIC at 07:13

## 2017-12-13 RX ADMIN — MOXIFLOXACIN 1 DROP: 5 SOLUTION/ DROPS OPHTHALMIC at 07:23

## 2017-12-13 RX ADMIN — PHENYLEPHRINE HYDROCHLORIDE 1 DROP: 2.5 SOLUTION/ DROPS OPHTHALMIC at 07:23

## 2017-12-13 RX ADMIN — DICLOFENAC SODIUM 1 DROP: 1 SOLUTION OPHTHALMIC at 07:18

## 2017-12-13 RX ADMIN — TROPICAMIDE 1 DROP: 10 SOLUTION/ DROPS OPHTHALMIC at 07:13

## 2017-12-13 RX ADMIN — SODIUM CHLORIDE, POTASSIUM CHLORIDE, SODIUM LACTATE AND CALCIUM CHLORIDE: 600; 310; 30; 20 INJECTION, SOLUTION INTRAVENOUS at 07:20

## 2017-12-13 RX ADMIN — DICLOFENAC SODIUM 1 DROP: 1 SOLUTION OPHTHALMIC at 07:23

## 2017-12-13 RX ADMIN — DEXMEDETOMIDINE HYDROCHLORIDE 12 MCG: 100 INJECTION, SOLUTION INTRAVENOUS at 09:00

## 2017-12-13 RX ADMIN — PHENYLEPHRINE HYDROCHLORIDE 1 DROP: 2.5 SOLUTION/ DROPS OPHTHALMIC at 07:13

## 2017-12-13 RX ADMIN — TROPICAMIDE 1 DROP: 10 SOLUTION/ DROPS OPHTHALMIC at 07:23

## 2017-12-13 RX ADMIN — LIDOCAINE HYDROCHLORIDE 1 ML: 10 INJECTION, SOLUTION EPIDURAL; INFILTRATION; INTRACAUDAL; PERINEURAL at 07:20

## 2017-12-13 RX ADMIN — PROPOFOL 10 MG: 10 INJECTION, EMULSION INTRAVENOUS at 09:10

## 2017-12-13 RX ADMIN — PROPARACAINE HYDROCHLORIDE 1 DROP: 5 SOLUTION/ DROPS OPHTHALMIC at 07:13

## 2017-12-13 RX ADMIN — MOXIFLOXACIN 1 DROP: 5 SOLUTION/ DROPS OPHTHALMIC at 07:18

## 2017-12-13 ASSESSMENT — LIFESTYLE VARIABLES: TOBACCO_USE: 0

## 2017-12-13 NOTE — IP AVS SNAPSHOT
[facility] [place]    [address]    [place] [address]    Phone:  [phone]    Fax:  [phone]                                       After Visit Summary   12/13/2017    Daniel Villanueva    MRN: [id number]           After Visit Summary Signature Page     I have received my discharge instructions, and my questions have been answered. I have discussed any challenges I see with this plan with the nurse or doctor.    ..........................................................................................................................................  Patient/Patient Representative Signature      ..........................................................................................................................................  Patient Representative Print Name and Relationship to Patient    ..................................................               ................................................  Date                                            Time    ..........................................................................................................................................  Reviewed by Signature/Title    ...................................................              ..............................................  Date                                                            Time

## 2017-12-13 NOTE — OR NURSING
Pt's BP had been on the lower side and Phase two and pt was barely responsive upon arrival.  Pt did wake up and tolerated sitting up with a SBP  Of 95 (see VS flowsheet).  Pt's BP upon arrival was not that much higher and pt is denying symptoms of low BP and no signs of low BP noted therefore pt was dc'd to home.

## 2017-12-13 NOTE — IP AVS SNAPSHOT
MRN:[id number]                      After Visit Summary   12/13/2017    Daniel Villanueva    MRN: [id number]           Thank you!     Thank you for choosing [facility] for your care. Our goal is always to provide you with excellent care. Hearing back from our patients is one way we can continue to improve our services. Please take a few minutes to complete the written survey that you may receive in the mail after you visit with us. Thank you!        Patient Information     Date Of Birth          1955        About your hospital stay     You were admitted on:  December 13, 2017 You last received care in the:  [facility]    You were discharged on:  December 13, 2017       Who to Call     For medical emergencies, please call 911.  For non-urgent questions about your medical care, please call your primary care provider or clinic, [phone]  For questions related to your surgery, please call your surgery clinic        Attending Provider     Provider Specialty    Neli Lomeli MD Ophthalmology       Primary Care Provider Office Phone # Fax #    Candelariolisa Edmar Whitehead [phone]      Further instructions from your care team           POST-OPERATIVE CARE FOLLOWING CATARACT SURGERY    DR. NELI LOMELI  [facility]  [phone]    Postoperative medications: After surgery, you will use several different eye drop medications. You may have either the brand specific form or generic of each type used.     Begin your eye drops today as directed.  You should instill the drop, close the eye without blinking and keep closed for 3 minutes allowing the drop to absorb.  It is fine to instill all 3 of the drops consecutively, waiting the 3 minutes in between each one. Place the shield for sleep.  In the morning, instill 1 drop of all 3 eye drops before your post-op appointment.      Antibiotic  Moxeza - is an antibiotic drop that is used to minimize the risk of  infection. Instill your first drop at bedtime tonight, then 2 times daily for one week.    Anti-inflammatory   Prolensa - use it once daily until gone, beginning today.    Steroid  Durezol - is a steroid drop used to minimize inflammation and modulate healing.  It should be used 2 times daily until gone, beginning with your first drop at bedtime tonight.        Do not rub the operated eye. Wear the eye shield during sleeping hours for one week.      Light sensitivity may be noticed. Sunglasses may be worn for comfort.      Some discomfort and irritation may be noticed. Acetaminophen (Tylenol) or Ibuprofen (Advil) may be taken for discomfort.      Avoid bending over, strenuous activity or heavy lifting for one week.      Keep the operated eye dry.      You may wash your hair, bathe or shower, but keep the operated eye closed while doing so.      Use medication exactly as prescribed by your doctor.  You may restart your regular home medications.      Bring all materials and medications to the clinic on your first post-operative visit.      Call the doctor s office if any of the following should occur:  -  any sudden vision change  -  nausea or severe headache  -  increase in pain not controlled  -  increased amount of floaters (black spots in front of vision)         -  or signs of infection (pus, increasing redness or tenderness)    Revised 12/10/2015    [facility] Discharge  Instructions  Anesthesia ([facility])   Adult Discharge Instructions    For 24 hours after surgery    1. Get plenty of rest.  Make arrangements to have a responsible adult stay with you for at least 6 hours after you leave the hospital.  2. Do not drive or use heavy equipment for 24 hours.    3. Do not drink alcohol for 24 hours.  4. Do not sign legal documents or make important decisions for 24 hours.  5. Avoid strenuous or risky activities. You may feel lightheaded.  If so, sit for a few minutes before  "standing.  Have someone help you get up.   6. Conscious sedation patients may resume a regular diet..  7. Any questions of medical nature, call your physician.    Pending Results     No orders found from 12/11/2017 to 12/14/2017.            Admission Information     Date & Time Provider Department Dept. Phone    12/13/2017 Mo Duncan MD [facility] [phone]      Your Vitals Were     Blood Pressure Temperature Respirations Height Weight Pulse Oximetry    119/79 96.8  F (36  C) (Temporal) 16 1.778 m (5' 10\") 132.9 kg (293 lb) 96%    BMI (Body Mass Index)                   42.04 kg/m2           MyChart Information     [employer] gives you secure access to your electronic health record. If you see a primary care provider, you can also send messages to your care team and make appointments. If you have questions, please call your primary care clinic.  If you do not have a primary care provider, please call [phone] and they will assist you.        Care EveryWhere ID     This is your Care EveryWhere ID. This could be used by other organizations to access your [place] medical records  [id number]        Equal Access to Services     SHANTEL CAT AH: Hadii ayush Martini, waveroda lujosué, qaybta kaalmada adezack, henry rodriguez. So [facility] [phone].    ATENCIÓN: Si habla español, tiene a barrera disposición servicios gratuitos de asistencia lingüística. Llame al [phone].    We comply with applicable federal civil rights laws and [place] laws. We do not discriminate on the basis of race, color, national origin, age, disability, sex, sexual orientation, or gender identity.               Review of your medicines      UNREVIEWED medicines. Ask your doctor about these medicines        Dose / Directions    ASPIRIN PO        Dose:  81 mg   Take 81 mg by mouth daily   Refills:  0       levothyroxine 150 MCG tablet   Commonly known as:  SYNTHROID/LEVOTHROID   Used " for:  Other specified hypothyroidism        Dose:  150 mcg   Take 1 tablet (150 mcg) by mouth daily   Quantity:  90 tablet   Refills:  3       OMEGA-3 FISH OIL PO        Refills:  0       simvastatin 40 MG tablet   Commonly known as:  ZOCOR   Used for:  Hyperlipidemia LDL goal <130        Dose:  40 mg   Take 1 tablet (40 mg) by mouth At Bedtime   Quantity:  90 tablet   Refills:  3                Protect others around you: Learn how to safely use, store and throw away your medicines at www.disposemymeds.org.             Medication List: This is a list of all your medications and when to take them. Check marks below indicate your daily home schedule. Keep this list as a reference.      Medications           Morning Afternoon Evening Bedtime As Needed    ASPIRIN PO   Take 81 mg by mouth daily                                levothyroxine 150 MCG tablet   Commonly known as:  SYNTHROID/LEVOTHROID   Take 1 tablet (150 mcg) by mouth daily                                OMEGA-3 FISH OIL PO                                simvastatin 40 MG tablet   Commonly known as:  ZOCOR   Take 1 tablet (40 mg) by mouth At Bedtime

## 2017-12-13 NOTE — ANESTHESIA CARE TRANSFER NOTE
Patient: Daniel Villanueva    Procedure(s):  RIGHT PHACOEMULSIFICATION CLEAR CORNEA WITH STANDARD INTRAOCULAR LENS IMPLANT    - Wound Class: I-Clean    Diagnosis: cataract  Diagnosis Additional Information: No value filed.    Anesthesia Type:   MAC     Note:  Airway :Room Air  Patient transferred to:PACU  Comments: Transferred to [facility] recovery room in recliner with armrests up, spontaneous respirations, O2 saturation maintained greater than 98% with oxygen via room air. All monitors and alarms on and functioning, clinically stable vital signs. Report given to recovery RN and questions answered. Patient alert and following verbal directions.Handoff Report: Identifed the Patient, Identified the Reponsible Provider, Reviewed the pertinent medical history, Discussed the surgical course, Reviewed Intra-OP anesthesia mangement and issues during anesthesia, Set expectations for post-procedure period and Allowed opportunity for questions and acknowledgement of understanding      Vitals: (Last set prior to Anesthesia Care Transfer)    CRNA VITALS  12/13/2017 0848 - 12/13/2017 0921      12/13/2017             Resp Rate (set): 10                Electronically Signed By: GASPER Santiago CRNA  December 13, 2017  9:21 AM

## 2017-12-13 NOTE — ANESTHESIA PREPROCEDURE EVALUATION
Anesthesia Evaluation     . Pt has had prior anesthetic.     No history of anesthetic complications          ROS/MED HX    ENT/Pulmonary:     (+)NAOMI risk factors snores loudly, obese, Past use , . .   (-) tobacco use and sleep apnea   Neurologic:       Cardiovascular:     (+) Dyslipidemia, ----. : . . . :. .       METS/Exercise Tolerance:     Hematologic:         Musculoskeletal:         GI/Hepatic:         Renal/Genitourinary:         Endo:     (+) thyroid problem hypothyroidism, Obesity, .      Psychiatric:         Infectious Disease:         Malignancy:         Other:                     Physical Exam  Normal systems: cardiovascular, pulmonary and dental    Airway   Mallampati: III  TM distance: >3 FB  Neck ROM: full    Dental     Cardiovascular       Pulmonary                         Anesthesia Plan      History & Physical Review  History and physical reviewed and following examination; no interval change.    ASA Status:  2 .    NPO Status:  > 8 hours    Plan for MAC Reason for MAC:  Procedure to face, neck, head or breast  PONV prophylaxis:  Ondansetron (or other 5HT-3)  Versed/precedex  No narcotic      Postoperative Care      Consents  Anesthetic plan, risks, benefits and alternatives discussed with:  Patient..                          .

## 2017-12-13 NOTE — OP NOTE
[facility]  Ophthalmology Operative Note    PREOPERATIVE DIAGNOSIS:  Dense cataract of the Right eye.       POSTOPERATIVE DIAGNOSIS:  Dense cataract of the Right eye.       OPERATION:  Clear corneal phacoemulsification with intraocular lens implant of the Right eye.       PROCEDURE:  Daniel Villanueva was brought into the operating room with a topical anesthetic.  Under the microscope after a sterile ophthalmic prep, a lid speculum was put into place.  Anterior chamber was entered with a #75 blade.  Anterior chamber was then filled with lidocaine solution and a viscoelastic material.  A keratome blade was then used to fashion a 2.6 mm temporal incision in the corneoscleral junction, a Malyugin ring was used to expand the pupil and anterior capsule of the lens was opened using a circular capsulorrhexis.  The lens was carefully hydrodissected.  The phacoemulsification tip was then introduced into the eye and a central groove fashioned.  The nucleus was split in half and then quartered, and nuclear material was aspirated.  The irrigation-aspiration apparatus was then utilized to clear the remaining cortical material.  The posterior capsule was polished and a foldable posterior chamber intraocular lens was then introduced into the capsular bag, unfolded, and rotated into the horizontal position.  No sutures were necessary to close the incision site.  The Malyugin ring was removed with ease.  The viscoelastic material was aspirated.  Drops of Moxeza and Prolensa were used on the surface of the eye and a clear shield was put over the eye before the patient was dismissed from the operating room.  The patient tolerated the procedure without difficulty.       Implant Name Type Inv. Item Serial No.  Lot No. LRB No. Used   EYE IMP IOL AVELINA PCL TECNIS ZCB00 18.5 Lens/Eye Implant EYE IMP IOL AVELINA PCL TECNIS ZCB00 18.5 [id number] [employer]   Right 1           NELI LOMELI MD

## 2017-12-13 NOTE — ANESTHESIA POSTPROCEDURE EVALUATION
Patient: Daniel Villanueva    Procedure(s):  COMPLEX RIGHT PHACOEMULSIFICATION CLEAR CORNEA WITH STANDARD INTRAOCULAR LENS IMPLANT    - Wound Class: I-Clean    Diagnosis:cataract  Diagnosis Additional Information: No value filed.    Anesthesia Type:  MAC    Note:  Anesthesia Post Evaluation    Patient location during evaluation: PACU  Patient participation: Able to fully participate in evaluation  Level of consciousness: awake  Pain management: adequate  Airway patency: patent  Cardiovascular status: acceptable  Respiratory status: acceptable  Hydration status: acceptable  PONV: none     Anesthetic complications: None          Last vitals:  Vitals:    12/13/17 0925 12/13/17 0930 12/13/17 0949   BP: (!) 88/52 99/54 95/69   Resp: 12 10 16   Temp:      SpO2:   94%         Electronically Signed By: Saira Morillo MD, MD  December 13, 2017  2:12 PM

## 2017-12-13 NOTE — DISCHARGE INSTRUCTIONS
POST-OPERATIVE CARE FOLLOWING CATARACT SURGERY    DR. NELI LOMELI  [facility]  [phone]    Postoperative medications: After surgery, you will use several different eye drop medications. You may have either the brand specific form or generic of each type used.     Begin your eye drops today as directed.  You should instill the drop, close the eye without blinking and keep closed for 3 minutes allowing the drop to absorb.  It is fine to instill all 3 of the drops consecutively, waiting the 3 minutes in between each one. Place the shield for sleep.  In the morning, instill 1 drop of all 3 eye drops before your post-op appointment.      Antibiotic  Moxeza - is an antibiotic drop that is used to minimize the risk of infection. Instill your first drop at bedtime tonight, then 2 times daily for one week.    Anti-inflammatory   Prolensa - use it once daily until gone, beginning today.    Steroid  Durezol - is a steroid drop used to minimize inflammation and modulate healing.  It should be used 2 times daily until gone, beginning with your first drop at bedtime tonight.        Do not rub the operated eye. Wear the eye shield during sleeping hours for one week.      Light sensitivity may be noticed. Sunglasses may be worn for comfort.      Some discomfort and irritation may be noticed. Acetaminophen (Tylenol) or Ibuprofen (Advil) may be taken for discomfort.      Avoid bending over, strenuous activity or heavy lifting for one week.      Keep the operated eye dry.      You may wash your hair, bathe or shower, but keep the operated eye closed while doing so.      Use medication exactly as prescribed by your doctor.  You may restart your regular home medications.      Bring all materials and medications to the clinic on your first post-operative visit.      Call the doctor s office if any of the following should occur:  -  any sudden vision change  -  nausea or severe headache  -  increase in pain not  controlled  -  increased amount of floaters (black spots in front of vision)         -  or signs of infection (pus, increasing redness or tenderness)    Revised 12/10/2015    [facility] Discharge  Instructions  Anesthesia (Eye Care Center)   Adult Discharge Instructions    For 24 hours after surgery    1. Get plenty of rest.  Make arrangements to have a responsible adult stay with you for at least 6 hours after you leave the hospital.  2. Do not drive or use heavy equipment for 24 hours.    3. Do not drink alcohol for 24 hours.  4. Do not sign legal documents or make important decisions for 24 hours.  5. Avoid strenuous or risky activities. You may feel lightheaded.  If so, sit for a few minutes before standing.  Have someone help you get up.   6. Conscious sedation patients may resume a regular diet..  7. Any questions of medical nature, call your physician.

## 2018-04-09 DIAGNOSIS — E78.5 HYPERLIPIDEMIA LDL GOAL <130: ICD-10-CM

## 2018-04-09 RX ORDER — SIMVASTATIN 40 MG
40 TABLET ORAL AT BEDTIME
Qty: 90 TABLET | Refills: 2 | Status: SHIPPED | OUTPATIENT
Start: 2018-04-09 | End: 2019-04-25

## 2018-04-09 NOTE — TELEPHONE ENCOUNTER
UA to refill per [facility] protocol  No LOL in past 12 months  Med and pharm loaded  Fritz Morales, RN, BSN

## 2018-07-16 ENCOUNTER — TELEPHONE (OUTPATIENT)
Dept: OTHER | Facility: CLINIC | Age: 63
End: 2018-07-16

## 2018-07-16 DIAGNOSIS — E66.9 OBESITY: Primary | ICD-10-CM

## 2018-07-16 NOTE — TELEPHONE ENCOUNTER
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

## 2018-07-18 ENCOUNTER — TELEPHONE (OUTPATIENT)
Dept: SLEEP MEDICINE | Facility: CLINIC | Age: 63
End: 2018-07-18

## 2018-08-03 ENCOUNTER — OFFICE VISIT (OUTPATIENT)
Dept: SLEEP MEDICINE | Facility: CLINIC | Age: 63
End: 2018-08-03
Payer: COMMERCIAL

## 2018-08-03 VITALS
HEIGHT: 70 IN | RESPIRATION RATE: 16 BRPM | BODY MASS INDEX: 41.66 KG/M2 | TEMPERATURE: 98.3 F | SYSTOLIC BLOOD PRESSURE: 122 MMHG | HEART RATE: 90 BPM | OXYGEN SATURATION: 95 % | WEIGHT: 291 LBS | DIASTOLIC BLOOD PRESSURE: 84 MMHG

## 2018-08-03 DIAGNOSIS — R29.818 SUSPECTED SLEEP APNEA: Primary | ICD-10-CM

## 2018-08-03 DIAGNOSIS — E66.01 MORBID OBESITY (H): ICD-10-CM

## 2018-08-03 DIAGNOSIS — R06.83 SNORING: ICD-10-CM

## 2018-08-03 DIAGNOSIS — E66.01 OBESITY, MORBID, BMI 40.0-49.9 (H): ICD-10-CM

## 2018-08-03 PROCEDURE — 99203 OFFICE O/P NEW LOW 30 MIN: CPT | Performed by: INTERNAL MEDICINE

## 2018-08-03 NOTE — PROGRESS NOTES
Sleep Consultation:    Date on this visit: 8/3/2018    Daniel Villanueva is sent by Oneal Whitehead for a sleep consultation regarding possible sleep apnea as part of DOT evaluation.    Primary Physician: Oneal Whitehead     Chief complaint: DOT evaluation     Presenting History:     Daniel Villanueva reports frequent snoring for many years. He has been identified as high risk for sleep apnea duirng his DOT physical and advised to complete sleep evaluation.     Daniel does snore frequently. Patient does have a regular bed partner. There is report of snoring.  He does not have witnessed apneas. They never sleep separately.  Patient sleeps on his side. He denies no morning dry mouth, morning headaches and restless legs. Daniel denies any bruxism, sleep walking, sleep talking, dream enactment, sleep paralysis, cataplexy and hypnogogic/hypnopompic hallucinations.    Daniel goes to sleep at 9:00 PM during the week. He wakes up at 4:00 AM with an alarm. He falls asleep in 5 minutes.  Daniel denies difficulty falling asleep.  He wakes up 2-3 times a night for 5 minutes before falling back to sleep.  Daniel wakes up to go to the bathroom.  On weekends, Daniel goes to sleep at 10:00 PM.  He wakes up at 6:00 AM without an alarm. He falls asleep in 5 minutes.  Patient gets an average of 7 hours of sleep per night.     Patient does not use electronics in bed.     Daniel does not do shift work.  He works day shifts as a .      He denies sleep walking as a child.  Daniel denies difficulty breathing through his nose.       Patient's Brandt Sleepiness score 1/24 consistent with no daytime sleepiness.      Daniel naps 1-2 times per week for 30-60 minutes, feels refreshed after naps. He takes no inadvertant naps.  He denies closing eyes, dozing and falling asleep while driving. Patient was counseled on the importance of driving while alert, to pull over if drowsy, or nap before getting into the vehicle if sleepy.       He uses no caffeine.     Allergies:    No Known Allergies    Medications:    Current Outpatient Prescriptions   Medication Sig Dispense Refill     ASPIRIN PO Take 81 mg by mouth daily       levothyroxine (SYNTHROID/LEVOTHROID) 150 MCG tablet Take 1 tablet (150 mcg) by mouth daily 90 tablet 3     Omega-3 Fatty Acids (OMEGA-3 FISH OIL PO)        simvastatin (ZOCOR) 40 MG tablet Take 1 tablet (40 mg) by mouth At Bedtime 90 tablet 2       Problem List:  Patient Active Problem List    Diagnosis Date Noted     Morbid obesity (H) 08/03/2018     Priority: Medium     Hyperlipidemia LDL goal <130 12/19/2012     Priority: Medium     CARDIOVASCULAR SCREENING; LDL GOAL LESS THAN 130 10/31/2010     Priority: Medium     Hypothyroidism 05/14/2007     Priority: Medium     Problem list name updated by automated process. Provider to review          Past Medical/Surgical History:  Past Medical History:   Diagnosis Date     Gynecomastia      Hyperlipidemia      Unspecified hypothyroidism      Past Surgical History:   Procedure Laterality Date     COLONOSCOPY       PHACOEMULSIFICATION CLEAR CORNEA WITH STANDARD INTRAOCULAR LENS IMPLANT Left 11/29/2017    Procedure: PHACOEMULSIFICATION CLEAR CORNEA WITH STANDARD INTRAOCULAR LENS IMPLANT;  COMPLEX LEFT ATTEMPTED PHACOEMULSIFICATION CLEAR CORNEA  ;  Surgeon: Mo Duncan MD;  Location: [facility]     PHACOEMULSIFICATION CLEAR CORNEA WITH STANDARD INTRAOCULAR LENS IMPLANT Right 12/13/2017    Procedure: PHACOEMULSIFICATION CLEAR CORNEA WITH STANDARD INTRAOCULAR LENS IMPLANT;  COMPLEX RIGHT PHACOEMULSIFICATION CLEAR CORNEA WITH STANDARD INTRAOCULAR LENS IMPLANT   ;  Surgeon: Mo Duncan MD;  Location: [facility]     VITRECTOMY PARSPLANA WITH 23 GAUGE SYSTEM, LENSECTOMY Left 11/29/2017    Procedure: COMBINED VITRECTOMY PARSPLANA, LENSECTOMY;  23 GAUGE VITRECTOMY AND LENSECTOMY;  Surgeon: Jason Nunes MD;  Location: [facility]       Social History:  Social History     Social History      Marital status:      Spouse name: Jennifer     Number of children: 1     Years of education: N/A     Occupational History      [employer]     Social History Main Topics     Smoking status: Never Smoker     Smokeless tobacco: Former User     Types: Chew      Comment: Years ago     Alcohol use Yes      Comment: rarely     Drug use: No     Sexual activity: Not on file     Other Topics Concern     Not on file     Social History Narrative       Family History:  Family History   Problem Relation Age of Onset     Cancer Maternal Grandmother      lung cancer in a smoker       Review of Systems:  A complete review of systems reviewed by me is negative with the exeption of what has been mentioned in the history of present illness.  CONSTITUTIONAL: NEGATIVE for weight gain/loss, fever, chills, sweats or night sweats, drug allergies.  EYES: NEGATIVE for changes in vision, blind spots, double vision.  ENT: NEGATIVE for ear pain, sore throat, sinus pain, post-nasal drip, runny nose, bloody nose  CARDIAC: NEGATIVE for fast heartbeats or fluttering in chest, chest pain or pressure, breathlessness when lying flat, swollen legs or swollen feet.  NEUROLOGIC: NEGATIVE headaches, weakness or numbness in the arms or legs.  DERMATOLOGIC: NEGATIVE for rashes, new moles or change in mole(s)  PULMONARY: NEGATIVE SOB at rest, SOB with activity, dry cough, productive cough, coughing up blood, wheezing or whistling when breathing.    GASTROINTESTINAL: NEGATIVE for nausea or vomitting, loose or watery stools, fat or grease in stools, constipation, abdominal pain, bowel movements black in color or blood noted.  GENITOURINARY: NEGATIVE for pain during urination, blood in urine, urinating more frequently than usual, irregular menstrual periods.  MUSCULOSKELETAL: NEGATIVE for muscle pain, bone or joint pain, swollen joints.  ENDOCRINE: NEGATIVE for increased thirst or urination, diabetes.  LYMPHATIC: NEGATIVE for swollen  "lymph nodes, lumps or bumps in the breasts or nipple discharge.    Physical Examination:  Vitals: /84  Pulse 90  Temp 98.3  F (36.8  C) (Oral)  Resp 16  Ht 1.778 m (5' 10\")  Wt 132 kg (291 lb)  SpO2 95%  BMI 41.75 kg/m2  BMI= Body mass index is 41.75 kg/(m^2).    Neck Cir (cm): 49 cm    [place] Total Score 8/3/2018   Total score - [place] 1       RANCHO Total Score: 0 (08/03/18 1357)    GENERAL APPEARANCE: healthy, alert and no distress  EYES: Eyes grossly normal to inspection, PERRL and conjunctivae and sclerae normal  HENT: nose and mouth without ulcers or lesions, oropharynx crowded, uvula elongated, soft palate dependent and tongue base enlarged  NECK: no adenopathy, no asymmetry, masses, or scars and thyroid normal to palpation  RESP: lungs clear to auscultation - no rales, rhonchi or wheezes  CV: regular rates and rhythm, normal S1 S2, no S3 or S4 and no murmur, click or rub  ABDOMEN: Negative  MS: extremities normal- no gross deformities noted  NEURO: Normal strength and tone, mentation intact and speech normal  PSYCH: mentation appears normal and affect normal/bright  Mallampati Class: IV.  Tonsillar Stage: 1  hidden by pillars.    Impression/Plan:    1. 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 Oropharynx is crowded on examination. Patient denies subjective symptoms of sleep apnea. There is an intermediate to high risk for sleep apnea based on anatomical and demographic risk factors. An overnight sleep study is recommended for evaluation.     Plan:     1. Split night PSG for assessment of sleep apnea       He will follow up with me in approximately two weeks after his sleep study has been competed to review the results and discuss plan of care.       Polysomnography reviewed.  Obstructive sleep apnea reviewed.  Complications of " untreated sleep apnea were reviewed.    I spent a total of 30 minutes with patient with rela day 50% in counseling     Nikko Gomez     CC: Oneal Whitehead

## 2018-08-03 NOTE — PATIENT INSTRUCTIONS

## 2018-10-01 ENCOUNTER — THERAPY VISIT (OUTPATIENT)
Dept: SLEEP MEDICINE | Facility: CLINIC | Age: 63
End: 2018-10-01
Payer: COMMERCIAL

## 2018-10-01 DIAGNOSIS — E66.01 OBESITY, MORBID, BMI 40.0-49.9 (H): ICD-10-CM

## 2018-10-01 DIAGNOSIS — R29.818 SUSPECTED SLEEP APNEA: ICD-10-CM

## 2018-10-01 DIAGNOSIS — R06.83 SNORING: ICD-10-CM

## 2018-10-01 PROCEDURE — 95810 POLYSOM 6/> YRS 4/> PARAM: CPT | Performed by: INTERNAL MEDICINE

## 2018-10-01 NOTE — MR AVS SNAPSHOT
After Visit Summary   10/1/2018    Daniel Villanueva    MRN: [id number]           Patient Information     Date Of Birth          1955        Visit Information        Provider Department      10/1/2018 8:30 PM BED 4 SH SLEEP [facility]        Today's Diagnoses     Suspected sleep apnea        Snoring        Obesity, morbid, BMI 40.0-49.9 (H)           Follow-ups after your visit        Your next 10 appointments already scheduled     Oct 12, 2018 11:30 AM CDT   Return Sleep Patient with Nikko Gomez MD   [facility] ([facility])    [address] [address] [phone]              Who to contact     If you have questions or need follow up information about today's clinic visit or your schedule please contact [facility] directly at [phone].  Normal or non-critical lab and imaging results will be communicated to you by MyChart, letter or phone within 4 business days after the clinic has received the results. If you do not hear from us within 7 days, please contact the clinic through [employer] or phone. If you have a critical or abnormal lab result, we will notify you by phone as soon as possible.  Submit refill requests through [employer] or call your pharmacy and they will forward the refill request to us. Please allow 3 business days for your refill to be completed.          Additional Information About Your Visit        MyChart Information     [employer] gives you secure access to your electronic health record. If you see a primary care provider, you can also send messages to your care team and make appointments. If you have questions, please call your primary care clinic.  If you do not have a primary care provider, please call [phone] and they will assist you.        Care EveryWhere ID     This is your Care EveryWhere ID. This could be used by other organizations to access your [place]  medical records  [id number]         Blood Pressure from Last 3 Encounters:   08/03/18 122/84   12/13/17 95/69   12/11/17 128/78    Weight from Last 3 Encounters:   08/03/18 132 kg (291 lb)   12/11/17 132.9 kg (293 lb)   12/11/17 134.3 kg (296 lb)              We Performed the Following     Comprehensive Sleep Study        Primary Care Provider Office Phone # Fax #    Ramyjesus Edmar Whitehead [phone] [address] [facility] [place] [id number]        Equal Access to Services     [facility]: Hadii aad ku hadasho Sojuordan, waaxda luqadaha, qaybta kaalmada adesotoyarabia, henry lopez . So [facility] [phone].    ATENCIÓN: Si habla español, tiene a barrera disposición servicios gratuitos de asistencia lingüística. [facility] [phone].    We comply with applicable federal civil rights laws and [place] laws. We do not discriminate on the basis of race, color, national origin, age, disability, sex, sexual orientation, or gender identity.            Thank you!     Thank you for choosing [facility]  for your care. Our goal is always to provide you with excellent care. Hearing back from our patients is one way we can continue to improve our services. Please take a few minutes to complete the written survey that you may receive in the mail after your visit with us. Thank you!             Your Updated Medication List - Protect others around you: Learn how to safely use, store and throw away your medicines at www.disposemymeds.org.          This list is accurate as of 10/1/18 11:59 PM.  Always use your most recent med list.                   Brand Name Dispense Instructions for use Diagnosis    ASPIRIN PO      Take 81 mg by mouth daily        levothyroxine 150 MCG tablet    SYNTHROID/LEVOTHROID    90 tablet    Take 1 tablet (150 mcg) by mouth daily    Other specified hypothyroidism       OMEGA-3 FISH OIL PO           simvastatin 40 MG tablet    ZOCOR    90 tablet     Take 1 tablet (40 mg) by mouth At Bedtime    Hyperlipidemia LDL goal <130

## 2018-10-04 NOTE — PROCEDURES
"SLEEP STUDY INTERPRETATION  DIAGNOSTIC POLYSOMNOGRAPHY REPORT      Patient: ALEIDA DONATO  YOB: 1955  Study Date: 10/1/2018  MRN: [id number]  Referring Provider: MD Oneal Whitehead  Ordering Provider: MD Nikko Gomez    Indications for Polysomnography: The patient is a 62 y old Male who is 5' 10\" and weighs 291.0 lbs. His BMI is 42.1, [place] sleepiness scale 1.0 and neck circumference is 49.0 cm. Relevant medical history includes no significant medical co-morbidities. A diagnostic polysomnogram was performed to evaluate for sleep apnea.    Polysomnogram Data: A full night polysomnogram recorded the standard physiologic parameters including EEG, EOG, EMG, ECG, nasal and oral airflow. Respiratory parameters of chest and abdominal movements were recorded with respiratory inductance plethysmography. Oxygen saturation was recorded by pulse oximetry. Hypopnea scoring rule used: 1B 4%.    Sleep Architecture: Increased arousal frequency and fragmented sleep.   The total recording time of the polysomnogram was 534.8 minutes. The total sleep time was 458.0 minutes. Sleep latency was decreased at 1.3 minutes without the use of a sleep aid. REM latency was 91.5 minutes. Arousal index was increased at 38.9 arousals per hour. Sleep efficiency was normal at 85.6%. Wake after sleep onset was 74.0 minutes. The patient spent 15.5% of total sleep time in Stage N1, 67.5% in Stage N2, 9.3% in Stage N3, and 7.8% in REM. Time in REM supine was - minutes.    Respiration: Mild obstructive sleep apnea.      Events ? The polysomnogram revealed a presence of 17 obstructive, 4 central, and - mixed apneas resulting in an apnea index of 2.8 events per hour. There were 25 obstructive hypopneas and - central hypopneas resulting in an obstructive hypopnea index of 3.3 and central hypopnea index of - events per hour. The combined apnea/hypopnea index was 6.0 events per hour (central apnea/hypopnea index was 0.5 events per " hour). The REM AHI was 22.0 events per hour. The supine AHI was - events per hour. The RERA index was 5.5 events per hour.  The RDI was 11.5 events per hour.    Snoring - was reported as loud.    Respiratory rate and pattern - was notable for normal respiratory rate and pattern.    Sustained Sleep Associated Hypoventilation - Transcutaneous carbon dioxide monitoring was not used; however significant hypoventilation was not suggested by oximetry.    Sleep Associated Hypoxemia - (Greater than 5 minutes O2 sat at or below 88%) was not present. Baseline oxygen saturation was 95.5%. Lowest oxygen saturation was 81.2%. Time spent less than or equal to 88% was 0.6 minutes. Time spent less than or equal to 89% was 1.1 minutes.    Movement Activity: Increased frequency of periodic limb movements of sleep and associated EEG arousals.     Periodic Limb Activity - There were 446 PLMs during the entire study. The PLM index was 58.4 movements per hour. The PLM Arousal Index was 13.4 per hour.    REM EMG Activity - Excessive transient/sustained muscle activity was not present.    Nocturnal Behavior - Abnormal sleep related behaviors were not noted during/arising out of NREM / REM sleep.     Bruxism - None apparent.    Cardiac Summary: Sinus rhythm with PVCs.   The average pulse rate was 71.0 bpm. The minimum pulse rate was 54.0 bpm while the maximum pulse rate was 102.8 bpm.  Arrhythmias were noted.      Assessment:     This sleep study shows a rather mild degree of obstructive sleep apnea. Of note, patient slept entire night in non-supine position.     There was an increased frequency of periodic limb movements of sleep and associate EEG arousals.     Recommendations:    Treatment is optional for mild obstructive sleep apnea.     If treatment of mild sleep apnea is indicated, patient may be a candidate for dental appliance through referral to Sleep Dentistry.    Weight management.     Pharmacologic therapy should be used for  management of restless legs syndrome only if present and clinically indicated and not based on the presence of periodic limb movements alone.    Diagnostic Codes:   Obstructive Sleep Apnea G47.33  Periodic Limb Movement Disorder G47.61  Repetitive Intrusions Into Sleep F51.8    10/1/2018 [place] [facility] Study (291.0 lbs) - AHI 6.0, RDI 11.5, Supine AHI -, REM AHI 22.0, Low O2 81.2%, Time Spent ?88% 0.6 minutes / Time Spent ?89% 1.1 minutes.      _____________________________________   Electronically Signed By: Nikko Gomez MD 10/04/2018

## 2018-10-05 LAB — SLPCOMP: NORMAL

## 2018-10-12 ENCOUNTER — OFFICE VISIT (OUTPATIENT)
Dept: SLEEP MEDICINE | Facility: CLINIC | Age: 63
End: 2018-10-12
Payer: COMMERCIAL

## 2018-10-12 VITALS
RESPIRATION RATE: 18 BRPM | OXYGEN SATURATION: 95 % | DIASTOLIC BLOOD PRESSURE: 84 MMHG | WEIGHT: 299.6 LBS | HEART RATE: 86 BPM | SYSTOLIC BLOOD PRESSURE: 135 MMHG | HEIGHT: 70 IN | BODY MASS INDEX: 42.89 KG/M2

## 2018-10-12 DIAGNOSIS — G47.33 OSA (OBSTRUCTIVE SLEEP APNEA): Primary | ICD-10-CM

## 2018-10-12 PROCEDURE — 99213 OFFICE O/P EST LOW 20 MIN: CPT | Performed by: INTERNAL MEDICINE

## 2018-10-12 NOTE — LETTER
October 12, 2018       To Whom It May Concern       Re: Daniel Villanueva  [address]  [facility] [address]      This is to confirm that I evaluated Mr. Daniel Villanueva in our sleep clinic for an assessment of sleep apnea. Apart from snoring, he doesnot have other clinical characteristics of sleep apnea. He does not have daytime sleepiness.      An overnight polysomnogram was performed on 10/1/2018 and showed an apnea hypopnea index of 6 per hour. In the absence of any significant medical comorbidity or clinical symptoms of obstructive sleep apnea syndrome, treatment is not clinically indicated for his marginally elevated apnea hypopnea index.       Please feel free to contact me with any questions.       Yours sincerely,          Dr. Nikko Gomez   Diplomate, Sleep medicine, American Board of Psychiatry and Neurology

## 2018-10-12 NOTE — PATIENT INSTRUCTIONS

## 2018-10-12 NOTE — MR AVS SNAPSHOT
After Visit Summary   10/12/2018    Daniel Villanueva    MRN: [id number]           Patient Information     Date Of Birth          1955        Visit Information        Provider Department      10/12/2018 11:30 AM Nikko Gomez MD [facility]        Today's Diagnoses     NAOMI (obstructive sleep apnea)    -  1      Care Instructions      Your BMI is Body mass index is 42.99 kg/(m^2).  Weight management is a personal decision.  If you are interested in exploring weight loss strategies, the following discussion covers the approaches that may be successful. Body mass index (BMI) is one way to tell whether you are at a healthy weight, overweight, or obese. It measures your weight in relation to your height.  A BMI of 18.5 to 24.9 is in the healthy range. A person with a BMI of 25 to 29.9 is considered overweight, and someone with a BMI of 30 or greater is considered obese. More than two-thirds of American adults are considered overweight or obese.  Being overweight or obese increases the risk for further weight gain. Excess weight may lead to heart disease and diabetes.  Creating and following plans for healthy eating and physical activity may help you improve your health.  Weight control is part of healthy lifestyle and includes exercise, emotional health, and healthy eating habits. Careful eating habits lifelong are the mainstay of weight control. Though there are significant health benefits from weight loss, long-term weight loss with diet alone may be very difficult to achieve- studies show long-term success with dietary management in less than 10% of people. Attaining a healthy weight may be especially difficult to achieve in those with severe obesity. In some cases, medications, devices and surgical management might be considered.  What can you do?  If you are overweight or obese and are interested in methods for weight loss, you should discuss this with your provider.     Consider  reducing daily calorie intake by 500 calories.     Keep a food journal.     Avoiding skipping meals, consider cutting portions instead.    Diet combined with exercise helps maintain muscle while optimizing fat loss. Strength training is particularly important for building and maintaining muscle mass. Exercise helps reduce stress, increase energy, and improves fitness. Increasing exercise without diet control, however, may not burn enough calories to loose weight.       Start walking three days a week 10-20 minutes at a time    Work towards walking thirty minutes five days a week     Eventually, increase the speed of your walking for 1-2 minutes at time    In addition, we recommend that you review healthy lifestyles and methods for weight loss available through the National Institutes of Health patient information sites:  http://win.niddk.nih.gov/publications/index.htm    And look into health and wellness programs that may be available through your health insurance provider, employer, local community center, or aquiles club.    Weight management plan: Patient was referred to their PCP to discuss a diet and exercise plan.            Follow-ups after your visit        Who to contact     If you have questions or need follow up information about today's clinic visit or your schedule please contact [facility] [place] [facility] directly at [phone].  Normal or non-critical lab and imaging results will be communicated to you by MyChart, letter or phone within 4 business days after the clinic has received the results. If you do not hear from us within 7 days, please contact the clinic through Dotflux[employer] or phone. If you have a critical or abnormal lab result, we will notify you by phone as soon as possible.  Submit refill requests through [employer] or call your pharmacy and they will forward the refill request to us. Please allow 3 business days for your refill to be completed.          Additional Information About Your Visit    "     MyChart Information     [employer] gives you secure access to your electronic health record. If you see a primary care provider, you can also send messages to your care team and make appointments. If you have questions, please call your primary care clinic.  If you do not have a primary care provider, please call [phone] and they will assist you.        Care EveryWhere ID     This is your Care EveryWhere ID. This could be used by other organizations to access your [facility] medical records  [id number]        Your Vitals Were     Pulse Respirations Height Pulse Oximetry BMI (Body Mass Index)       86 18 1.778 m (5' 10\") 95% 42.99 kg/m2        Blood Pressure from Last 3 Encounters:   10/12/18 135/84   08/03/18 122/84   12/13/17 95/69    Weight from Last 3 Encounters:   10/12/18 135.9 kg (299 lb 9.6 oz)   08/03/18 132 kg (291 lb)   12/11/17 132.9 kg (293 lb)              Today, you had the following     No orders found for display       Primary Care Provider Office Phone # Fax #    Oneal Whitehead [phone]       [address]  [place] [address]        Equal Access to Services     SHANTEL CAT : Hadii aad ku hadasho Soomaali, waaxda luqadaha, qaybta kaalmada adeegyada, waxay genaroin hayjessican anna lopez . So [facility] [phone].    ATENCIÓN: Si habla español, tiene a barrera disposición servicios gratuitos de asistencia lingüística. Llame al [phone].    We comply with applicable federal civil rights laws and [place] laws. We do not discriminate on the basis of race, color, national origin, age, disability, sex, sexual orientation, or gender identity.            Thank you!     Thank you for choosing [facility]  for your care. Our goal is always to provide you with excellent care. Hearing back from our patients is one way we can continue to improve our services. Please take a few minutes to complete the written survey that you may receive in the mail " after your visit with us. Thank you!             Your Updated Medication List - Protect others around you: Learn how to safely use, store and throw away your medicines at www.disposemymeds.org.          This list is accurate as of 10/12/18  1:58 PM.  Always use your most recent med list.                   Brand Name Dispense Instructions for use Diagnosis    ASPIRIN PO      Take 81 mg by mouth daily        levothyroxine 150 MCG tablet    SYNTHROID/LEVOTHROID    90 tablet    Take 1 tablet (150 mcg) by mouth daily    Other specified hypothyroidism       OMEGA-3 FISH OIL PO           simvastatin 40 MG tablet    ZOCOR    90 tablet    Take 1 tablet (40 mg) by mouth At Bedtime    Hyperlipidemia LDL goal <130

## 2018-10-12 NOTE — NURSING NOTE
"Chief Complaint   Patient presents with     Study Results       Initial BP (!) 148/97  Pulse 86  Resp 18  Ht 1.778 m (5' 10\")  Wt 135.9 kg (299 lb 9.6 oz)  SpO2 95%  BMI 42.99 kg/m2 Estimated body mass index is 42.99 kg/(m^2) as calculated from the following:    Height as of this encounter: 1.778 m (5' 10\").    Weight as of this encounter: 135.9 kg (299 lb 9.6 oz).    Medication Reconciliation: complete    ESS 1    Carolina Cunningham  Sleep Clinic - Specialist      "

## 2018-10-12 NOTE — PROGRESS NOTES
Sleep Study Follow-Up Visit:    Date on this visit: 10/12/2018    Daniel Villanueva comes in today for follow-up of his sleep study done on 10/01/2018 at the [facility] for possible sleep apnea.    Sleep latency 1.3 minutes without Ambien.  REM achieved.   REM latency 91.5 minutes.  Sleep efficiency 85.6%. Total sleep time 458 minutes.    Sleep architecture:  Stage 1, 15.5% (5%), stage 2, 67.5% (45-55%), stage 3, 9.3% (15-20%), stage REM, 7.8% (20-25%).  AHI was 6.0, without significant desaturations. RDI 11.5.  REM AHI  22, consistent with moderate REM NAOMI.  Supine AHI -, .  Periodic Limb Movement Index 58.4/hour.  PLM arousal index 13.4    These findings were reviewed with patient.     Past medical/surgical history, family history, social history, medications and allergies were reviewed.      Problem List:  Patient Active Problem List    Diagnosis Date Noted     Morbid obesity (H) 08/03/2018     Priority: Medium     Hyperlipidemia LDL goal <130 12/19/2012     Priority: Medium     CARDIOVASCULAR SCREENING; LDL GOAL LESS THAN 130 10/31/2010     Priority: Medium     Hypothyroidism 05/14/2007     Priority: Medium     Problem list name updated by automated process. Provider to review          Impression/Plan:    1. Mild Obstructive sleep apnea  2. Periodic limb movement disorder     - Patient has a mild increase in AHI at 6 per hour. Apart from snoring, he doesnot have  clinical symptoms of sleep apnea syndrome. He denies sleep disturbance or any significant daytime sleepiness. He doesnot want treatmenet and considering rather midl increase in AHI without significant daytime dysfunction or medical comorbidities, treatment is not clinically indicated. He can consider oral appliance therapy.     - We discussed periodic limb movements of sleep. Patient denies symptoms of restless leg syndrome. He denies any sleep disruption or daytime dysfunction, Pharmacotherapy was discussed. Patient declines  treatment.     Fifteen minutes spent with patient, all of which were spent face-to-face counseling, consulting, coordinating plan of care.      Dr. Nikko Gomez     CC: Oneal Whitehead

## 2018-12-11 DIAGNOSIS — E03.8 OTHER SPECIFIED HYPOTHYROIDISM: ICD-10-CM

## 2018-12-11 RX ORDER — LEVOTHYROXINE SODIUM 150 UG/1
150 TABLET ORAL DAILY
Qty: 90 TABLET | Refills: 3 | Status: SHIPPED | OUTPATIENT
Start: 2018-12-11 | End: 2019-04-25 | Stop reason: DRUGHIGH

## 2019-04-08 DIAGNOSIS — Z12.5 SCREENING FOR PROSTATE CANCER: ICD-10-CM

## 2019-04-08 DIAGNOSIS — R73.01 IMPAIRED FASTING GLUCOSE: ICD-10-CM

## 2019-04-08 DIAGNOSIS — E78.5 HYPERLIPIDEMIA LDL GOAL <70: ICD-10-CM

## 2019-04-08 DIAGNOSIS — E03.9 HYPOTHYROIDISM: Primary | ICD-10-CM

## 2019-04-18 DIAGNOSIS — Z12.5 SCREENING FOR PROSTATE CANCER: ICD-10-CM

## 2019-04-18 DIAGNOSIS — R73.01 IMPAIRED FASTING GLUCOSE: ICD-10-CM

## 2019-04-18 DIAGNOSIS — E78.5 HYPERLIPIDEMIA LDL GOAL <70: ICD-10-CM

## 2019-04-18 DIAGNOSIS — E03.9 HYPOTHYROIDISM: ICD-10-CM

## 2019-04-18 LAB
ALBUMIN SERPL-MCNC: 3.8 G/DL (ref 3.4–5)
ALP SERPL-CCNC: 51 U/L (ref 40–150)
ALT SERPL W P-5'-P-CCNC: 38 U/L (ref 0–70)
ANION GAP SERPL CALCULATED.3IONS-SCNC: 6 MMOL/L (ref 3–14)
AST SERPL W P-5'-P-CCNC: 20 U/L (ref 0–45)
BASOPHILS # BLD AUTO: 0 10E9/L (ref 0–0.2)
BASOPHILS NFR BLD AUTO: 0.5 %
BILIRUB DIRECT SERPL-MCNC: 0.1 MG/DL (ref 0–0.2)
BILIRUB SERPL-MCNC: 0.5 MG/DL (ref 0.2–1.3)
BUN SERPL-MCNC: 18 MG/DL (ref 7–30)
CALCIUM SERPL-MCNC: 8.8 MG/DL (ref 8.5–10.1)
CHLORIDE SERPL-SCNC: 111 MMOL/L (ref 94–109)
CHOLEST SERPL-MCNC: 108 MG/DL
CO2 SERPL-SCNC: 22 MMOL/L (ref 20–32)
CREAT SERPL-MCNC: 0.9 MG/DL (ref 0.66–1.25)
CREAT UR-MCNC: 63 MG/DL
DIFFERENTIAL METHOD BLD: NORMAL
EOSINOPHIL # BLD AUTO: 0.4 10E9/L (ref 0–0.7)
EOSINOPHIL NFR BLD AUTO: 5.7 %
ERYTHROCYTE [DISTWIDTH] IN BLOOD BY AUTOMATED COUNT: 14.4 % (ref 10–15)
GFR SERPL CREATININE-BSD FRML MDRD: 90 ML/MIN/{1.73_M2}
GLUCOSE SERPL-MCNC: 110 MG/DL (ref 70–99)
HBA1C MFR BLD: 5.2 % (ref 0–5.6)
HCT VFR BLD AUTO: 47.2 % (ref 40–53)
HDLC SERPL-MCNC: 46 MG/DL
HGB BLD-MCNC: 16 G/DL (ref 13.3–17.7)
LDLC SERPL CALC-MCNC: 44 MG/DL
LYMPHOCYTES # BLD AUTO: 2.1 10E9/L (ref 0.8–5.3)
LYMPHOCYTES NFR BLD AUTO: 28.9 %
MCH RBC QN AUTO: 31.6 PG (ref 26.5–33)
MCHC RBC AUTO-ENTMCNC: 33.9 G/DL (ref 31.5–36.5)
MCV RBC AUTO: 93 FL (ref 78–100)
MICROALBUMIN UR-MCNC: 6 MG/L
MICROALBUMIN/CREAT UR: 9.12 MG/G CR (ref 0–17)
MONOCYTES # BLD AUTO: 0.9 10E9/L (ref 0–1.3)
MONOCYTES NFR BLD AUTO: 12 %
NEUTROPHILS # BLD AUTO: 3.9 10E9/L (ref 1.6–8.3)
NEUTROPHILS NFR BLD AUTO: 52.9 %
NONHDLC SERPL-MCNC: 62 MG/DL
PLATELET # BLD AUTO: 219 10E9/L (ref 150–450)
POTASSIUM SERPL-SCNC: 4.2 MMOL/L (ref 3.4–5.3)
PROT SERPL-MCNC: 7.1 G/DL (ref 6.8–8.8)
PSA SERPL-ACNC: 1.24 UG/L (ref 0–4)
RBC # BLD AUTO: 5.07 10E12/L (ref 4.4–5.9)
SODIUM SERPL-SCNC: 139 MMOL/L (ref 133–144)
T3FREE SERPL-MCNC: 2.6 PG/ML (ref 2.3–4.2)
T4 FREE SERPL-MCNC: 1.04 NG/DL (ref 0.76–1.46)
TRIGL SERPL-MCNC: 92 MG/DL
TSH SERPL DL<=0.005 MIU/L-ACNC: 8.55 MU/L (ref 0.4–4)
WBC # BLD AUTO: 7.4 10E9/L (ref 4–11)

## 2019-04-18 PROCEDURE — G0103 PSA SCREENING: HCPCS | Performed by: INTERNAL MEDICINE

## 2019-04-18 PROCEDURE — 36415 COLL VENOUS BLD VENIPUNCTURE: CPT | Performed by: INTERNAL MEDICINE

## 2019-04-18 PROCEDURE — 85025 COMPLETE CBC W/AUTO DIFF WBC: CPT | Performed by: INTERNAL MEDICINE

## 2019-04-18 PROCEDURE — 82043 UR ALBUMIN QUANTITATIVE: CPT | Performed by: INTERNAL MEDICINE

## 2019-04-18 PROCEDURE — 84443 ASSAY THYROID STIM HORMONE: CPT | Performed by: INTERNAL MEDICINE

## 2019-04-18 PROCEDURE — 83036 HEMOGLOBIN GLYCOSYLATED A1C: CPT | Performed by: INTERNAL MEDICINE

## 2019-04-18 PROCEDURE — 84439 ASSAY OF FREE THYROXINE: CPT | Performed by: INTERNAL MEDICINE

## 2019-04-18 PROCEDURE — 84481 FREE ASSAY (FT-3): CPT | Performed by: INTERNAL MEDICINE

## 2019-04-18 PROCEDURE — 80061 LIPID PANEL: CPT | Performed by: INTERNAL MEDICINE

## 2019-04-18 PROCEDURE — 80076 HEPATIC FUNCTION PANEL: CPT | Performed by: INTERNAL MEDICINE

## 2019-04-18 PROCEDURE — 80048 BASIC METABOLIC PNL TOTAL CA: CPT | Performed by: INTERNAL MEDICINE

## 2019-04-25 ENCOUNTER — OFFICE VISIT (OUTPATIENT)
Dept: OTHER | Facility: CLINIC | Age: 64
End: 2019-04-25
Attending: INTERNAL MEDICINE
Payer: COMMERCIAL

## 2019-04-25 VITALS
RESPIRATION RATE: 16 BRPM | OXYGEN SATURATION: 96 % | WEIGHT: 288 LBS | HEIGHT: 70 IN | SYSTOLIC BLOOD PRESSURE: 110 MMHG | DIASTOLIC BLOOD PRESSURE: 63 MMHG | BODY MASS INDEX: 41.23 KG/M2 | HEART RATE: 72 BPM

## 2019-04-25 DIAGNOSIS — E78.5 HYPERLIPIDEMIA LDL GOAL <70: ICD-10-CM

## 2019-04-25 DIAGNOSIS — E66.01 MORBID OBESITY (H): ICD-10-CM

## 2019-04-25 DIAGNOSIS — E03.9 HYPOTHYROIDISM, UNSPECIFIED TYPE: ICD-10-CM

## 2019-04-25 DIAGNOSIS — Z12.5 SCREENING FOR PROSTATE CANCER: ICD-10-CM

## 2019-04-25 DIAGNOSIS — R73.01 IMPAIRED FASTING GLUCOSE: ICD-10-CM

## 2019-04-25 DIAGNOSIS — E78.5 HYPERLIPIDEMIA LDL GOAL <130: ICD-10-CM

## 2019-04-25 DIAGNOSIS — Z00.00 PHYSICAL EXAM, ANNUAL: Primary | ICD-10-CM

## 2019-04-25 PROCEDURE — 99213 OFFICE O/P EST LOW 20 MIN: CPT | Mod: 25 | Performed by: INTERNAL MEDICINE

## 2019-04-25 PROCEDURE — 99396 PREV VISIT EST AGE 40-64: CPT | Mod: ZP | Performed by: INTERNAL MEDICINE

## 2019-04-25 PROCEDURE — G0463 HOSPITAL OUTPT CLINIC VISIT: HCPCS

## 2019-04-25 RX ORDER — LEVOTHYROXINE SODIUM 175 UG/1
175 TABLET ORAL DAILY
Qty: 90 TABLET | Refills: 3 | Status: SHIPPED | OUTPATIENT
Start: 2019-04-25 | End: 2019-08-30

## 2019-04-25 RX ORDER — SIMVASTATIN 40 MG
40 TABLET ORAL AT BEDTIME
Qty: 90 TABLET | Refills: 3 | Status: SHIPPED | OUTPATIENT
Start: 2019-04-25 | End: 2020-05-12

## 2019-04-25 ASSESSMENT — MIFFLIN-ST. JEOR: SCORE: 2102.61

## 2019-04-25 NOTE — PROGRESS NOTES
SUBJECTIVE:    CC: Daniel Villanueva is a 63 year old male who presents for:       Physical exam, annual  Hypothyroidism, unspecified type  Impaired fasting glucose  Hyperlipidemia LDL goal <70  Screening for prostate cancer  Morbid obesity (H)          HISTORIES:    PROBLEM LIST:                   Patient Active Problem List   Diagnosis     Hypothyroidism     CARDIOVASCULAR SCREENING; LDL GOAL LESS THAN 130     Hyperlipidemia LDL goal <130     Morbid obesity (H)       PAST MEDICAL HISTORY:                  Past Medical History:   Diagnosis Date     Gynecomastia      Hyperlipidemia      Unspecified hypothyroidism        PAST SURGICAL HISTORY:                  Past Surgical History:   Procedure Laterality Date     COLONOSCOPY       PHACOEMULSIFICATION CLEAR CORNEA WITH STANDARD INTRAOCULAR LENS IMPLANT Left 11/29/2017    Procedure: PHACOEMULSIFICATION CLEAR CORNEA WITH STANDARD INTRAOCULAR LENS IMPLANT;  COMPLEX LEFT ATTEMPTED PHACOEMULSIFICATION CLEAR CORNEA  ;  Surgeon: Mo Duncan MD;  Location: [facility]     PHACOEMULSIFICATION CLEAR CORNEA WITH STANDARD INTRAOCULAR LENS IMPLANT Right 12/13/2017    Procedure: PHACOEMULSIFICATION CLEAR CORNEA WITH STANDARD INTRAOCULAR LENS IMPLANT;  COMPLEX RIGHT PHACOEMULSIFICATION CLEAR CORNEA WITH STANDARD INTRAOCULAR LENS IMPLANT   ;  Surgeon: Mo Duncan MD;  Location: [facility]     VITRECTOMY PARSPLANA WITH 23 GAUGE SYSTEM, LENSECTOMY Left 11/29/2017    Procedure: COMBINED VITRECTOMY PARSPLANA, LENSECTOMY;  23 GAUGE VITRECTOMY AND LENSECTOMY;  Surgeon: Jason Nunes MD;  Location: [facility]       CURRENT MEDICATIONS:                  Current Outpatient Medications   Medication Sig Dispense Refill     ASPIRIN PO Take 81 mg by mouth daily       levothyroxine (SYNTHROID/LEVOTHROID) 150 MCG tablet Take 1 tablet (150 mcg) by mouth daily 90 tablet 3     Omega-3 Fatty Acids (OMEGA-3 FISH OIL PO)        simvastatin (ZOCOR) 40 MG tablet Take 1 tablet (40 mg) by mouth At Bedtime  90 tablet 2       ALLERGIES:                No Known Allergies    SOCIAL HISTORY:                  Social History     Socioeconomic History     Marital status:      Spouse name: Jennifer     Number of children: 1     Years of education: Not on file     Highest education level: Not on file   Occupational History     Occupation:      Employer: [employer]   Social Needs     Financial resource strain: Not on file     Food insecurity:     Worry: Not on file     Inability: Not on file     Transportation needs:     Medical: Not on file     Non-medical: Not on file   Tobacco Use     Smoking status: Never Smoker     Smokeless tobacco: Former User     Types: Chew     Tobacco comment: Years ago   Substance and Sexual Activity     Alcohol use: Yes     Comment: rarely     Drug use: No     Sexual activity: Not on file   Lifestyle     Physical activity:     Days per week: Not on file     Minutes per session: Not on file     Stress: Not on file   Relationships     Social connections:     Talks on phone: Not on file     Gets together: Not on file     Attends Christian service: Not on file     Active member of club or organization: Not on file     Attends meetings of clubs or organizations: Not on file     Relationship status: Not on file     Intimate partner violence:     Fear of current or ex partner: Not on file     Emotionally abused: Not on file     Physically abused: Not on file     Forced sexual activity: Not on file   Other Topics Concern     Parent/sibling w/ CABG, MI or angioplasty before 65F 55M? Not Asked   Social History Narrative     Not on file       FAMILY HISTORY:                   Family History   Problem Relation Age of Onset     Cancer Maternal Grandmother         lung cancer in a smoker                             REVIEW OF SYSTEMS:  CONSTITUTIONAL:no malaise, fatigue, or other general symptoms  EYES: no subjective changes in visual acuity, no photophobia  ENT/MOUTH: no complaints of  "rhinorrhea, nasal congestion, sore throat, hearing changes  RESP:no SOB  CV: no c/o exertional chest pressure or CHUNG  GI: No abdominal pain, constipation, change in bowel movements, nausea, pyrosis, BRBPR  :no polyuria or polydipsia, no dysuria, no gross hematuria  MUSCULOSKELATAL:no arthalgias or myalgias  INTEGUMENTARY/SKIN: no pruritis, rashes, or moles with recent change in size, shape, or pigmentation  BREAST: no lumps, masses, or discharges  NEURO: no gross sensory or motor symptoms, no dizziness, no confusion  ENDOCRINE: no polyuria or polydipsia, no heat or cold intolerance  HEME/ALLERGY/IMMUNE: no fevers, chills, night sweats, or unwanted weight loss  PSYCHIATRIC: no depression, anxiety, or internal stimuli    EXAM:    /63 (BP Location: Right arm, Patient Position: Chair, Cuff Size: Adult Large)   Pulse 72   Resp 16   Ht 5' 10\" (1.778 m)   Wt 288 lb (130.6 kg)   SpO2 96%   BMI 41.32 kg/m    BMI: Body mass index is 41.32 kg/m .  GENERAL APPEARANCE: healthy, alert and no distress   EXAM:  EYES: clear conjunctiva, no cataracts, no obvious fundoscopic abnormalities  HENT: oropharynx, nares, and TMs are WNL  NECK: no JVD, thyromegaly or lymphadenopathy, no cervical bruits  RESP: clear to auscultation without rales, wheezes, or rhonchi  CV: RRR, no murmurs, gallops, or rubs  LYMPH: no cervical , axillary, or inguinal lymphadenopathy appreciated  GI: NABS, ND/NT, no masses or organomegally appreciated  MS: no obvoius clinicallly relevant arthropathy, no evidence vasculitis  SKIN: no nevi clinically suspicious for malignancy are noted  NEURO: CN II-XII intact, no localizing sensory or motor abnoramlities noted, DTRs symmetrical bilaterally  PSYCH: Mental status exam reveals the pt to have normal mood and affect. There is no disorder of thought form or content. There is no response to internal stimuli. There is no suicidal or homicidal ideation.        Component      Latest Ref Rng & Units 4/18/2019 "   WBC      4.0 - 11.0 10e9/L 7.4   RBC Count      4.4 - 5.9 10e12/L 5.07   Hemoglobin      13.3 - 17.7 g/dL 16.0   Hematocrit      40.0 - 53.0 % 47.2   MCV      78 - 100 fl 93   MCH      26.5 - 33.0 pg 31.6   MCHC      31.5 - 36.5 g/dL 33.9   RDW      10.0 - 15.0 % 14.4   Platelet Count      150 - 450 10e9/L 219   % Neutrophils      % 52.9   % Lymphocytes      % 28.9   % Monocytes      % 12.0   % Eosinophils      % 5.7   % Basophils      % 0.5   Absolute Neutrophil      1.6 - 8.3 10e9/L 3.9   Absolute Lymphocytes      0.8 - 5.3 10e9/L 2.1   Absolute Monocytes      0.0 - 1.3 10e9/L 0.9   Absolute Eosinophils      0.0 - 0.7 10e9/L 0.4   Absolute Basophils      0.0 - 0.2 10e9/L 0.0   Diff Method       Automated Method   Sodium      133 - 144 mmol/L 139   Potassium      3.4 - 5.3 mmol/L 4.2   Chloride      94 - 109 mmol/L 111 (H)   Carbon Dioxide      20 - 32 mmol/L 22   Anion Gap      3 - 14 mmol/L 6   Glucose      70 - 99 mg/dL 110 (H)   Urea Nitrogen      7 - 30 mg/dL 18   Creatinine      0.66 - 1.25 mg/dL 0.90   GFR Estimate      >60 mL/min/1.73:m2 90   GFR Estimate If Black      >60 mL/min/1.73:m2 >90   Calcium      8.5 - 10.1 mg/dL 8.8   Bilirubin Direct      0.0 - 0.2 mg/dL 0.1   Bilirubin Total      0.2 - 1.3 mg/dL 0.5   Albumin      3.4 - 5.0 g/dL 3.8   Protein Total      6.8 - 8.8 g/dL 7.1   Alkaline Phosphatase      40 - 150 U/L 51   ALT      0 - 70 U/L 38   AST      0 - 45 U/L 20   Cholesterol      <200 mg/dL 108   Triglycerides      <150 mg/dL 92   HDL Cholesterol      >39 mg/dL 46   LDL Cholesterol Calculated      <100 mg/dL 44   Non HDL Cholesterol      <130 mg/dL 62   Creatinine Urine      mg/dL 63   Albumin Urine mg/L      mg/L 6   Albumin Urine mg/g Cr      0 - 17 mg/g Cr 9.12   TSH      0.40 - 4.00 mU/L 8.55 (H)   PSA      0 - 4 ug/L 1.24   T4 Free      0.76 - 1.46 ng/dL 1.04   Free T3      2.3 - 4.2 pg/mL 2.6   Hemoglobin A1C      0 - 5.6 % 5.2                  (Z00.00) Physical exam, annual   (primary encounter diagnosis)  Comment: doing well, du e for oclonosocpy in 2026  Plan: RTC one year for annual physical    (E03.9) Hypothyroidism, unspecified type  Comment:TSH elevated, increase Synthroid from 150 to 175 mcg PO daily, RTC three months for labs  Plan: OFFICE/OUTPT VISIT,EST,LEVL III, levothyroxine         (SYNTHROID/LEVOTHROID) 175 MCG tablet, T3 Free,        T4 free, TSH            (R73.01) Impaired fasting glucose  Comment: avoid CHO  Plan: OFFICE/OUTPT VISIT,EST,LEVL III           (E78.5) Hyperlipidemia LDL goal <70  Comment: at goal  Plan: OFFICE/OUTPT VISIT,EST,LEVL III; simvastatin (ZOCOR) 40 MG tablet            (Z12.5) Screening for prostate cancer  Comment: check labs in one year  Plan: OFFICE/OUTPT VISIT,EST,LEVL III           (E66.01) Morbid obesity (H)  Comment: Body mass index is 41.32 kg/m . , attempt weight loss  Plan: OFFICE/OUTPT VISIT,EST,LEVL III               Greater than one half the 15 minutes not spent on preventive care during this visit was spent providing aducation and counselling regarding item(s) # 2 onward as delineated above.                            I have discussed with patient the risks, benefits, medications, treatment options and modalities.   I have instructed the patient to call or schedule a follow-up appointment if any problems or failure to improve.

## 2019-04-25 NOTE — PROGRESS NOTES
"Daniel Villanueva is a 63 year old male who presents for:  Chief Complaint   Patient presents with     RECHECK     follow up labs         Vitals:    Vitals:    04/25/19 1533   BP: 110/63   BP Location: Right arm   Patient Position: Chair   Cuff Size: Adult Large   Pulse: 72   Resp: 16   SpO2: 96%   Weight: 288 lb (130.6 kg)   Height: 5' 10\" (1.778 m)       BMI:  Estimated body mass index is 41.32 kg/m  as calculated from the following:    Height as of this encounter: 5' 10\" (1.778 m).    Weight as of this encounter: 288 lb (130.6 kg).    Pain Score:  Data Unavailable        Nicole Baer    "

## 2019-08-21 DIAGNOSIS — E03.9 HYPOTHYROIDISM, UNSPECIFIED TYPE: ICD-10-CM

## 2019-08-21 LAB
T3FREE SERPL-MCNC: 2.8 PG/ML (ref 2.3–4.2)
T4 FREE SERPL-MCNC: 1.58 NG/DL (ref 0.76–1.46)
TSH SERPL DL<=0.005 MIU/L-ACNC: 0.88 MU/L (ref 0.4–4)

## 2019-08-21 PROCEDURE — 84443 ASSAY THYROID STIM HORMONE: CPT | Performed by: INTERNAL MEDICINE

## 2019-08-21 PROCEDURE — 84481 FREE ASSAY (FT-3): CPT | Performed by: INTERNAL MEDICINE

## 2019-08-21 PROCEDURE — 36415 COLL VENOUS BLD VENIPUNCTURE: CPT | Performed by: INTERNAL MEDICINE

## 2019-08-21 PROCEDURE — 84439 ASSAY OF FREE THYROXINE: CPT | Performed by: INTERNAL MEDICINE

## 2019-08-30 ENCOUNTER — OFFICE VISIT (OUTPATIENT)
Dept: OTHER | Facility: CLINIC | Age: 64
End: 2019-08-30
Attending: INTERNAL MEDICINE
Payer: OTHER MISCELLANEOUS

## 2019-08-30 VITALS
HEIGHT: 70 IN | SYSTOLIC BLOOD PRESSURE: 127 MMHG | BODY MASS INDEX: 39.65 KG/M2 | WEIGHT: 277 LBS | DIASTOLIC BLOOD PRESSURE: 81 MMHG | RESPIRATION RATE: 16 BRPM | HEART RATE: 88 BPM | OXYGEN SATURATION: 97 %

## 2019-08-30 DIAGNOSIS — E03.9 HYPOTHYROIDISM, UNSPECIFIED TYPE: Primary | ICD-10-CM

## 2019-08-30 PROCEDURE — G0463 HOSPITAL OUTPT CLINIC VISIT: HCPCS

## 2019-08-30 PROCEDURE — 99214 OFFICE O/P EST MOD 30 MIN: CPT | Mod: ZP | Performed by: INTERNAL MEDICINE

## 2019-08-30 RX ORDER — LEVOTHYROXINE SODIUM 150 UG/1
TABLET ORAL
Qty: 45 TABLET | Refills: 3 | Status: SHIPPED | OUTPATIENT
Start: 2019-08-30 | End: 2022-08-29

## 2019-08-30 RX ORDER — LEVOTHYROXINE SODIUM 175 UG/1
TABLET ORAL
Qty: 45 TABLET | Refills: 3 | Status: SHIPPED | OUTPATIENT
Start: 2019-08-30 | End: 2020-05-28

## 2019-08-30 ASSESSMENT — MIFFLIN-ST. JEOR: SCORE: 2052.71

## 2019-08-30 NOTE — PROGRESS NOTES
"Daniel Villanueva is a 63 year old male who presents for:  Chief Complaint   Patient presents with     RECHECK     4 month f/u. labs on 08/21/19. vincenzo 04/25/19        Vitals:    Vitals:    08/30/19 1108   BP: 127/81   BP Location: Right arm   Patient Position: Chair   Cuff Size: Adult Regular   Pulse: 88   Resp: 16   SpO2: 97%   Weight: 277 lb (125.6 kg)   Height: 5' 10\" (1.778 m)       BMI:  Estimated body mass index is 39.75 kg/m  as calculated from the following:    Height as of this encounter: 5' 10\" (1.778 m).    Weight as of this encounter: 277 lb (125.6 kg).    Pain Score:  Data Unavailable        Nicole Baer [facility]    "

## 2019-08-30 NOTE — PROGRESS NOTES
Daniel Villanueva is a 63 year old male who is presenting at the current time to discuss his diagnosi(es) of Hypothyroidism, unspecified type      Review Of Systems  Skin: negative  Eyes: negative  Ears/Nose/Throat: negative  Respiratory: No shortness of breath, dyspnea on exertion, cough, or hemoptysis  Cardiovascular: negative  Gastrointestinal: negative  Genitourinary: negative  Musculoskeletal: negative  Neurologic: negative  Psychiatric: negative  Hematologic/Lymphatic/Immunologic: negative  Endocrine: negative     Physical exam Reveals:    O/P: WNL  HEENT: WNL  NECK: No JVD, thyromegaly, or lymphadenopathy  HEART: RRR, no murmurs, gallops, or rubs  LUNGS: CTA bilaterally without rales, wheezes, or rhonchi  GI: NABS, nondistended, nontender, soft  EXT:without cyanosis, clubbing, or edema  NEURO: nonfocal  : no flank tenderness      Component      Latest Ref Rng & Units 4/18/2019 8/21/2019   WBC      4.0 - 11.0 10e9/L 7.4    RBC Count      4.4 - 5.9 10e12/L 5.07    Hemoglobin      13.3 - 17.7 g/dL 16.0    Hematocrit      40.0 - 53.0 % 47.2    MCV      78 - 100 fl 93    MCH      26.5 - 33.0 pg 31.6    MCHC      31.5 - 36.5 g/dL 33.9    RDW      10.0 - 15.0 % 14.4    Platelet Count      150 - 450 10e9/L 219    % Neutrophils      % 52.9    % Lymphocytes      % 28.9    % Monocytes      % 12.0    % Eosinophils      % 5.7    % Basophils      % 0.5    Absolute Neutrophil      1.6 - 8.3 10e9/L 3.9    Absolute Lymphocytes      0.8 - 5.3 10e9/L 2.1    Absolute Monocytes      0.0 - 1.3 10e9/L 0.9    Absolute Eosinophils      0.0 - 0.7 10e9/L 0.4    Absolute Basophils      0.0 - 0.2 10e9/L 0.0    Diff Method       Automated Method    Sodium      133 - 144 mmol/L 139    Potassium      3.4 - 5.3 mmol/L 4.2    Chloride      94 - 109 mmol/L 111 (H)    Carbon Dioxide      20 - 32 mmol/L 22    Anion Gap      3 - 14 mmol/L 6    Glucose      70 - 99 mg/dL 110 (H)    Urea Nitrogen      7 - 30 mg/dL 18    Creatinine      0.66 - 1.25  mg/dL 0.90    GFR Estimate      >60 mL/min/1.73:m2 90    GFR Estimate If Black      >60 mL/min/1.73:m2 >90    Calcium      8.5 - 10.1 mg/dL 8.8    Bilirubin Direct      0.0 - 0.2 mg/dL 0.1    Bilirubin Total      0.2 - 1.3 mg/dL 0.5    Albumin      3.4 - 5.0 g/dL 3.8    Protein Total      6.8 - 8.8 g/dL 7.1    Alkaline Phosphatase      40 - 150 U/L 51    ALT      0 - 70 U/L 38    AST      0 - 45 U/L 20    Cholesterol      <200 mg/dL 108    Triglycerides      <150 mg/dL 92    HDL Cholesterol      >39 mg/dL 46    LDL Cholesterol Calculated      <100 mg/dL 44    Non HDL Cholesterol      <130 mg/dL 62    Creatinine Urine      mg/dL 63    Albumin Urine mg/L      mg/L 6    Albumin Urine mg/g Cr      0 - 17 mg/g Cr 9.12    TSH      0.40 - 4.00 mU/L 8.55 (H) 0.88   PSA      0 - 4 ug/L 1.24    T4 Free      0.76 - 1.46 ng/dL 1.04 1.58 (H)   Free T3      2.3 - 4.2 pg/mL 2.6 2.8   Hemoglobin A1C      0 - 5.6 % 5.2        A/P:    (E03.9) Hypothyroidism, unspecified type  (primary encounter diagnosis)  Comment: on 150 mcg daily Synthroid, he was hypothyroid. On 175 mcg daily he is hyperthyroid.  Plan: alternate 150 mcg daily Synthroid with 175 mcg daily. RTC in [facility], check labs one week earlier.

## 2019-10-01 ENCOUNTER — HEALTH MAINTENANCE LETTER (OUTPATIENT)
Age: 64
End: 2019-10-01

## 2019-10-22 ENCOUNTER — TRANSFERRED RECORDS (OUTPATIENT)
Dept: HEALTH INFORMATION MANAGEMENT | Facility: CLINIC | Age: 64
End: 2019-10-22

## 2019-10-23 ENCOUNTER — TRANSFERRED RECORDS (OUTPATIENT)
Dept: HEALTH INFORMATION MANAGEMENT | Facility: CLINIC | Age: 64
End: 2019-10-23

## 2019-10-30 ENCOUNTER — HOSPITAL ENCOUNTER (OUTPATIENT)
Dept: LAB | Facility: CLINIC | Age: 64
Discharge: HOME | End: 2019-10-30
Attending: INTERNAL MEDICINE | Admitting: INTERNAL MEDICINE
Payer: OTHER MISCELLANEOUS

## 2019-10-30 ENCOUNTER — OFFICE VISIT (OUTPATIENT)
Dept: OTHER | Facility: CLINIC | Age: 64
End: 2019-10-30
Attending: INTERNAL MEDICINE
Payer: OTHER MISCELLANEOUS

## 2019-10-30 VITALS
HEART RATE: 80 BPM | SYSTOLIC BLOOD PRESSURE: 137 MMHG | BODY MASS INDEX: 42.47 KG/M2 | WEIGHT: 296 LBS | OXYGEN SATURATION: 94 % | DIASTOLIC BLOOD PRESSURE: 81 MMHG

## 2019-10-30 DIAGNOSIS — E03.9 HYPOTHYROIDISM, UNSPECIFIED TYPE: ICD-10-CM

## 2019-10-30 DIAGNOSIS — Z01.818 PREOP GENERAL PHYSICAL EXAM: Primary | ICD-10-CM

## 2019-10-30 LAB
APTT PPP: 29 SEC (ref 22–37)
BASOPHILS # BLD AUTO: 0.1 10E9/L (ref 0–0.2)
BASOPHILS NFR BLD AUTO: 0.5 %
DIFFERENTIAL METHOD BLD: NORMAL
EOSINOPHIL # BLD AUTO: 0.4 10E9/L (ref 0–0.7)
EOSINOPHIL NFR BLD AUTO: 4.8 %
ERYTHROCYTE [DISTWIDTH] IN BLOOD BY AUTOMATED COUNT: 13.4 % (ref 10–15)
HCT VFR BLD AUTO: 46 % (ref 40–53)
HGB BLD-MCNC: 15.7 G/DL (ref 13.3–17.7)
IMM GRANULOCYTES # BLD: 0 10E9/L (ref 0–0.4)
IMM GRANULOCYTES NFR BLD: 0.3 %
INR PPP: 1.02 (ref 0.86–1.14)
LYMPHOCYTES # BLD AUTO: 2.6 10E9/L (ref 0.8–5.3)
LYMPHOCYTES NFR BLD AUTO: 28.1 %
MCH RBC QN AUTO: 31.6 PG (ref 26.5–33)
MCHC RBC AUTO-ENTMCNC: 34.1 G/DL (ref 31.5–36.5)
MCV RBC AUTO: 93 FL (ref 78–100)
MONOCYTES # BLD AUTO: 1 10E9/L (ref 0–1.3)
MONOCYTES NFR BLD AUTO: 10.5 %
NEUTROPHILS # BLD AUTO: 5.1 10E9/L (ref 1.6–8.3)
NEUTROPHILS NFR BLD AUTO: 55.8 %
NRBC # BLD AUTO: 0 10*3/UL
NRBC BLD AUTO-RTO: 0 /100
PLATELET # BLD AUTO: 217 10E9/L (ref 150–450)
RBC # BLD AUTO: 4.97 10E12/L (ref 4.4–5.9)
T3FREE SERPL-MCNC: 2.6 PG/ML (ref 2.3–4.2)
T4 FREE SERPL-MCNC: 1.02 NG/DL (ref 0.76–1.46)
TSH SERPL DL<=0.005 MIU/L-ACNC: 4.16 MU/L (ref 0.4–4)
WBC # BLD AUTO: 9.1 10E9/L (ref 4–11)

## 2019-10-30 PROCEDURE — 85730 THROMBOPLASTIN TIME PARTIAL: CPT | Performed by: INTERNAL MEDICINE

## 2019-10-30 PROCEDURE — 36415 COLL VENOUS BLD VENIPUNCTURE: CPT | Performed by: INTERNAL MEDICINE

## 2019-10-30 PROCEDURE — G0463 HOSPITAL OUTPT CLINIC VISIT: HCPCS | Mod: 25

## 2019-10-30 PROCEDURE — 84481 FREE ASSAY (FT-3): CPT | Performed by: INTERNAL MEDICINE

## 2019-10-30 PROCEDURE — 84439 ASSAY OF FREE THYROXINE: CPT | Performed by: INTERNAL MEDICINE

## 2019-10-30 PROCEDURE — 99244 OFF/OP CNSLTJ NEW/EST MOD 40: CPT | Mod: ZP | Performed by: INTERNAL MEDICINE

## 2019-10-30 PROCEDURE — 85025 COMPLETE CBC W/AUTO DIFF WBC: CPT | Performed by: INTERNAL MEDICINE

## 2019-10-30 PROCEDURE — 85610 PROTHROMBIN TIME: CPT | Performed by: INTERNAL MEDICINE

## 2019-10-30 PROCEDURE — 84443 ASSAY THYROID STIM HORMONE: CPT | Performed by: INTERNAL MEDICINE

## 2019-10-30 PROCEDURE — 93010 ELECTROCARDIOGRAM REPORT: CPT | Mod: ZP | Performed by: INTERNAL MEDICINE

## 2019-10-30 NOTE — PROGRESS NOTES
[facility]  [address]  [place] [address]  [phone]  Dept: [phone]    PRE-OP EVALUATION:  Today's date: 10/30/2019    Daniel Villanueva (: 1955) presents for pre-operative evaluation assessment as requested by Dr. Agudelo.  He requires evaluation and anesthesia risk assessment prior to undergoing surgery/procedure for treatment of rotator cuff tear .    Proposed Surgery/ Procedure: Left rotator cuff   Date of Surgery/ Procedure:   Time of Surgery/ Procedure: 9:00am  Hospital/Surgical Facility: [facility]  Fax number for surgical facility: [phone]  Primary Physician: Oneal Whitehead  Type of Anesthesia Anticipated: General    Patient has a Health Care Directive or Living Will:  NO    1. NO - Do you have a history of heart attack, stroke, stent, bypass or surgery on an artery in the head, neck, heart or legs?  2. NO - Do you ever have any pain or discomfort in your chest?  3. NO - Do you have a history of  Heart Failure?  4. NO - Are you troubled by shortness of breath when: walking on the level, up a slight hill or at night?  5. NO - Do you currently have a cold, bronchitis or other respiratory infection?  6. NO - Do you have a cough, shortness of breath or wheezing?  7. NO - Do you sometimes get pains in the calves of your legs when you walk?  8. NO - Do you or anyone in your family have previous history of blood clots?  9. NO - Do you or does anyone in your family have a serious bleeding problem such as prolonged bleeding following surgeries or cuts?  10. NO - Have you ever had problems with anemia or been told to take iron pills?  11. NO - Have you had any abnormal blood loss such as black, tarry or bloody stools, or abnormal vaginal bleeding?  12. NO - Have you ever had a blood transfusion?  13. NO - Have you or any of your relatives ever had problems with anesthesia?  14. NO - Do you have sleep apnea, excessive  snoring or daytime drowsiness?  15. NO - Do you have any prosthetic heart valves?  16. NO - Do you have prosthetic joints?  17. NO - Is there any chance that you may be pregnant?      HPI:     HPI related to upcoming procedure: The patient experienced a left rotator cuff tear at work on 9-25-19. He is wishing to pusue definitive surgical repair.       See problem list for active medical problems.  Problems all longstanding and stable, except as noted/documented.  See ROS for pertinent symptoms related to these conditions.      MEDICAL HISTORY:     Patient Active Problem List    Diagnosis Date Noted     Morbid obesity (H) 08/03/2018     Priority: Medium     Hyperlipidemia LDL goal <130 12/19/2012     Priority: Medium     CARDIOVASCULAR SCREENING; LDL GOAL LESS THAN 130 10/31/2010     Priority: Medium     Hypothyroidism 05/14/2007     Priority: Medium     Problem list name updated by automated process. Provider to review        Past Medical History:   Diagnosis Date     Gynecomastia      Hyperlipidemia      Unspecified hypothyroidism      Past Surgical History:   Procedure Laterality Date     COLONOSCOPY       PHACOEMULSIFICATION CLEAR CORNEA WITH STANDARD INTRAOCULAR LENS IMPLANT Left 11/29/2017    Procedure: PHACOEMULSIFICATION CLEAR CORNEA WITH STANDARD INTRAOCULAR LENS IMPLANT;  COMPLEX LEFT ATTEMPTED PHACOEMULSIFICATION CLEAR CORNEA  ;  Surgeon: Mo Duncan MD;  Location:  [facility]     PHACOEMULSIFICATION CLEAR CORNEA WITH STANDARD INTRAOCULAR LENS IMPLANT Right 12/13/2017    Procedure: PHACOEMULSIFICATION CLEAR CORNEA WITH STANDARD INTRAOCULAR LENS IMPLANT;  COMPLEX RIGHT PHACOEMULSIFICATION CLEAR CORNEA WITH STANDARD INTRAOCULAR LENS IMPLANT   ;  Surgeon: Mo Duncan MD;  Location:  [facility]     VITRECTOMY PARSPLANA WITH 23 GAUGE SYSTEM, LENSECTOMY Left 11/29/2017    Procedure: COMBINED VITRECTOMY PARSPLANA, LENSECTOMY;  23 GAUGE VITRECTOMY AND LENSECTOMY;  Surgeon: Jason Nunes MD;  Location:   [facility]     Current Outpatient Medications   Medication Sig Dispense Refill     ASPIRIN PO Take 81 mg by mouth daily       levothyroxine (SYNTHROID/LEVOTHROID) 150 MCG tablet Alternate 150 mcg with 175 mcg PO every other day. 45 tablet 3     levothyroxine (SYNTHROID/LEVOTHROID) 175 MCG tablet Alternate 150 mcg with 175 mcg PO every other day. 45 tablet 3     Omega-3 Fatty Acids (OMEGA-3 FISH OIL PO)        simvastatin (ZOCOR) 40 MG tablet Take 1 tablet (40 mg) by mouth At Bedtime 90 tablet 3     OTC products: Aspirin    No Known Allergies   Latex Allergy: NO    Social History     Tobacco Use     Smoking status: Never Smoker     Smokeless tobacco: Former User     Types: Chew     Tobacco comment: Years ago   Substance Use Topics     Alcohol use: Yes     Comment: rarely     History   Drug Use No       REVIEW OF SYSTEMS:   CONSTITUTIONAL: NEGATIVE for fever, chills, change in weight  INTEGUMENTARY/SKIN: NEGATIVE for worrisome rashes, moles or lesions  EYES: NEGATIVE for vision changes or irritation  ENT/MOUTH: NEGATIVE for ear, mouth and throat problems  RESP: NEGATIVE for significant cough or SOB  BREAST: NEGATIVE for masses, tenderness or discharge  CV: NEGATIVE for chest pain, palpitations or peripheral edema  GI: NEGATIVE for nausea, abdominal pain, heartburn, or change in bowel habits  : NEGATIVE for frequency, dysuria, or hematuria  MUSCULOSKELETAL:POSITIVE  for limited range of motion with use of the arm, particularly attempting to raise arm above the shoulder  NEURO: NEGATIVE for weakness, dizziness or paresthesias  ENDOCRINE: NEGATIVE for temperature intolerance, skin/hair changes  HEME: NEGATIVE for bleeding problems  PSYCHIATRIC: NEGATIVE for changes in mood or affect    EXAM:   /81 (BP Location: Right arm, Patient Position: Sitting, Cuff Size: Adult Large)   Pulse 80   Wt 296 lb (134.3 kg)   SpO2 94%   BMI 42.47 kg/m      GENERAL APPEARANCE: healthy, alert and no distress     EYES: EOMI,  PERRL      HENT: ear canals and TM's normal and nose and mouth without ulcers or lesions     NECK: no adenopathy, no asymmetry, masses, or scars and thyroid normal to palpation     RESP: lungs clear to auscultation - no rales, rhonchi or wheezes     CV: regular rates and rhythm, normal S1 S2, no S3 or S4 and no murmur, click or rub     ABDOMEN:  soft, nontender, no HSM or masses and bowel sounds normal     MS: decreased ROM to adduction     SKIN: no suspicious lesions or rashes     NEURO: Normal strength and tone, sensory exam grossly normal, mentation intact and speech normal     PSYCH: mentation appears normal. and affect normal/bright     LYMPHATICS: No cervical adenopathy    DIAGNOSTICS:   EKG: appears normal, NSR, normal axis, normal intervals, no acute ST/T changes c/w ischemia, no LVH by voltage criteria    Recent Labs   Lab Test 04/18/19  0834 12/11/17  0729 10/31/17  0731 02/17/17  0723   HGB 16.0 14.0 15.4 14.9     --   --  226   INR  --  0.92 1.03  --      --  139 140   POTASSIUM 4.2  --  4.1 4.4   CR 0.90  --  0.96 1.01   A1C 5.2  --  5.7 5.5        IMPRESSION:   Reason for surgery/procedure: rotator cuff tear  Diagnosis/reason for consult: hyperlpidemia    The proposed surgical procedure is considered LOW risk.    REVISED CARDIAC RISK INDEX  The patient has the following serious cardiovascular risks for perioperative complications such as (MI, PE, VFib and 3  AV Block):  No serious cardiac risks  INTERPRETATION: 0 risks: Class I (very low risk - 0.4% complication rate)    The patient has the following additional risks for perioperative complications:  No identified additional risks      ICD-10-CM    1. Preop general physical exam Z01.818 CBC with platelets differential     INR     Partial thromboplastin time       RECOMMENDATIONS:     --Consult hospital rounder / IM to assist post-op medical management    --Patient is to take all scheduled medications on the day of surgery EXCEPT for modifications  listed below.    Anticoagulant or Antiplatelet Medication Use  ASPIRIN: Discontinue ASA 7-10 days prior to procedure to reduce bleeding risk.  It should be resumed post-operatively.        APPROVAL GIVEN to proceed with proposed procedure, without further diagnostic evaluation       Signed Electronically by: Oneal Whitehead MD    Copy of this evaluation report is provided to requesting physician.    [facility] Preop Guidelines    Revised Cardiac Risk Index

## 2019-11-06 NOTE — NURSING NOTE
Face, labs, EKG & office visit note faxed to Dr. Agudelo's office:    [phone]    Carol Perez RN BSN  [facility]

## 2019-11-18 ENCOUNTER — TRANSFERRED RECORDS (OUTPATIENT)
Dept: HEALTH INFORMATION MANAGEMENT | Facility: CLINIC | Age: 64
End: 2019-11-18

## 2019-11-25 ENCOUNTER — DOCUMENTATION ONLY (OUTPATIENT)
Dept: OTHER | Facility: CLINIC | Age: 64
End: 2019-11-25

## 2019-12-03 ENCOUNTER — APPOINTMENT (OUTPATIENT)
Dept: LAB | Facility: CLINIC | Age: 64
End: 2019-12-03
Payer: COMMERCIAL

## 2019-12-12 ENCOUNTER — TRANSFERRED RECORDS (OUTPATIENT)
Dept: HEALTH INFORMATION MANAGEMENT | Facility: CLINIC | Age: 64
End: 2019-12-12

## 2020-01-20 ENCOUNTER — TRANSFERRED RECORDS (OUTPATIENT)
Dept: HEALTH INFORMATION MANAGEMENT | Facility: CLINIC | Age: 65
End: 2020-01-20

## 2020-04-28 ENCOUNTER — TELEPHONE (OUTPATIENT)
Dept: OTHER | Facility: CLINIC | Age: 65
End: 2020-04-28

## 2020-04-28 DIAGNOSIS — E03.9 HYPOTHYROIDISM, UNSPECIFIED TYPE: Primary | ICD-10-CM

## 2020-04-28 DIAGNOSIS — E78.5 HYPERLIPIDEMIA LDL GOAL <70: ICD-10-CM

## 2020-04-28 DIAGNOSIS — Z00.00 PHYSICAL EXAM, ANNUAL: ICD-10-CM

## 2020-04-28 DIAGNOSIS — R73.01 IMPAIRED FASTING GLUCOSE: ICD-10-CM

## 2020-04-28 DIAGNOSIS — Z12.5 SCREENING FOR PROSTATE CANCER: ICD-10-CM

## 2020-04-28 NOTE — TELEPHONE ENCOUNTER
Patient called requesting to make an appointment.        Will need labs & office visit when COVID-19 restrictions lift.    Routing to scheduling to put on recall list.    Patient will need fasting labs & office visit for annual exam. (Non Urgent)

## 2020-05-12 ENCOUNTER — TELEPHONE (OUTPATIENT)
Dept: OTHER | Facility: CLINIC | Age: 65
End: 2020-05-12

## 2020-05-12 DIAGNOSIS — E78.5 HYPERLIPIDEMIA LDL GOAL <130: ICD-10-CM

## 2020-05-12 RX ORDER — SIMVASTATIN 40 MG
TABLET ORAL
Qty: 90 TABLET | Refills: 0 | Status: SHIPPED | OUTPATIENT
Start: 2020-05-12 | End: 2020-05-28

## 2020-05-12 NOTE — TELEPHONE ENCOUNTER
Refill of atorvastatin provided to patient.    Patient is due for fasting labs & office visit since 4/28/20      Routing to scheduling to arrange for fasting labs, virtual office visit one week later.    Carol Perez RN BSN  [facility]  [phone]

## 2020-05-12 NOTE — TELEPHONE ENCOUNTER
simvastatin (ZOCOR) 40 MG tablet  Last Written Prescription Date:  4/25/19  Last Fill Quantity: 90,  # refills: 3   Last office visit: 10/30/19    Unable to fill per [facility] according to the [facility] protocol.    [facility] Protocol [id number]/2020 08:55 AM   LDL on file in past 12 months       Medication and pharmacy loaded.  Telephone encounter created to arrange for due labs & virtual office visit.      Carol Perez RN BSN  [facility]  [phone]

## 2020-05-21 DIAGNOSIS — E78.5 HYPERLIPIDEMIA LDL GOAL <70: ICD-10-CM

## 2020-05-21 DIAGNOSIS — R73.01 IMPAIRED FASTING GLUCOSE: ICD-10-CM

## 2020-05-21 DIAGNOSIS — Z12.5 SCREENING FOR PROSTATE CANCER: ICD-10-CM

## 2020-05-21 DIAGNOSIS — Z00.00 PHYSICAL EXAM, ANNUAL: ICD-10-CM

## 2020-05-21 LAB
ALBUMIN SERPL-MCNC: 3.5 G/DL (ref 3.4–5)
ALP SERPL-CCNC: 40 U/L (ref 40–150)
ALT SERPL W P-5'-P-CCNC: 49 U/L (ref 0–70)
ANION GAP SERPL CALCULATED.3IONS-SCNC: 8 MMOL/L (ref 3–14)
AST SERPL W P-5'-P-CCNC: 23 U/L (ref 0–45)
BASOPHILS # BLD AUTO: 0 10E9/L (ref 0–0.2)
BASOPHILS NFR BLD AUTO: 0.5 %
BILIRUB DIRECT SERPL-MCNC: 0.1 MG/DL (ref 0–0.2)
BILIRUB SERPL-MCNC: 0.4 MG/DL (ref 0.2–1.3)
BUN SERPL-MCNC: 20 MG/DL (ref 7–30)
CALCIUM SERPL-MCNC: 9 MG/DL (ref 8.5–10.1)
CHLORIDE SERPL-SCNC: 106 MMOL/L (ref 94–109)
CHOLEST SERPL-MCNC: 137 MG/DL
CO2 SERPL-SCNC: 22 MMOL/L (ref 20–32)
CREAT SERPL-MCNC: 0.99 MG/DL (ref 0.66–1.25)
DIFFERENTIAL METHOD BLD: NORMAL
EOSINOPHIL # BLD AUTO: 0.3 10E9/L (ref 0–0.7)
EOSINOPHIL NFR BLD AUTO: 3.9 %
ERYTHROCYTE [DISTWIDTH] IN BLOOD BY AUTOMATED COUNT: 13.9 % (ref 10–15)
GFR SERPL CREATININE-BSD FRML MDRD: 80 ML/MIN/{1.73_M2}
GLUCOSE SERPL-MCNC: 108 MG/DL (ref 70–99)
HBA1C MFR BLD: 5.5 % (ref 0–5.6)
HCT VFR BLD AUTO: 47.7 % (ref 40–53)
HDLC SERPL-MCNC: 45 MG/DL
HGB BLD-MCNC: 16.2 G/DL (ref 13.3–17.7)
LDLC SERPL CALC-MCNC: 74 MG/DL
LYMPHOCYTES # BLD AUTO: 2.2 10E9/L (ref 0.8–5.3)
LYMPHOCYTES NFR BLD AUTO: 28 %
MCH RBC QN AUTO: 31.4 PG (ref 26.5–33)
MCHC RBC AUTO-ENTMCNC: 34 G/DL (ref 31.5–36.5)
MCV RBC AUTO: 92 FL (ref 78–100)
MONOCYTES # BLD AUTO: 1 10E9/L (ref 0–1.3)
MONOCYTES NFR BLD AUTO: 12.3 %
NEUTROPHILS # BLD AUTO: 4.4 10E9/L (ref 1.6–8.3)
NEUTROPHILS NFR BLD AUTO: 55.3 %
NONHDLC SERPL-MCNC: 92 MG/DL
PLATELET # BLD AUTO: 213 10E9/L (ref 150–450)
POTASSIUM SERPL-SCNC: 4 MMOL/L (ref 3.4–5.3)
PROT SERPL-MCNC: 7.3 G/DL (ref 6.8–8.8)
PSA SERPL-ACNC: 1.05 UG/L (ref 0–4)
RBC # BLD AUTO: 5.16 10E12/L (ref 4.4–5.9)
SODIUM SERPL-SCNC: 136 MMOL/L (ref 133–144)
T3FREE SERPL-MCNC: 2.7 PG/ML (ref 2.3–4.2)
T4 FREE SERPL-MCNC: 1.01 NG/DL (ref 0.76–1.46)
TRIGL SERPL-MCNC: 89 MG/DL
TSH SERPL DL<=0.005 MIU/L-ACNC: 6.49 MU/L (ref 0.4–4)
WBC # BLD AUTO: 8 10E9/L (ref 4–11)

## 2020-05-21 PROCEDURE — 36415 COLL VENOUS BLD VENIPUNCTURE: CPT | Performed by: INTERNAL MEDICINE

## 2020-05-21 PROCEDURE — 80061 LIPID PANEL: CPT | Performed by: INTERNAL MEDICINE

## 2020-05-21 PROCEDURE — 84439 ASSAY OF FREE THYROXINE: CPT | Performed by: INTERNAL MEDICINE

## 2020-05-21 PROCEDURE — 84443 ASSAY THYROID STIM HORMONE: CPT | Performed by: INTERNAL MEDICINE

## 2020-05-21 PROCEDURE — 85025 COMPLETE CBC W/AUTO DIFF WBC: CPT | Performed by: INTERNAL MEDICINE

## 2020-05-21 PROCEDURE — G0103 PSA SCREENING: HCPCS | Performed by: INTERNAL MEDICINE

## 2020-05-21 PROCEDURE — 84481 FREE ASSAY (FT-3): CPT | Performed by: INTERNAL MEDICINE

## 2020-05-21 PROCEDURE — 80048 BASIC METABOLIC PNL TOTAL CA: CPT | Performed by: INTERNAL MEDICINE

## 2020-05-21 PROCEDURE — 80076 HEPATIC FUNCTION PANEL: CPT | Performed by: INTERNAL MEDICINE

## 2020-05-21 PROCEDURE — 83036 HEMOGLOBIN GLYCOSYLATED A1C: CPT | Performed by: INTERNAL MEDICINE

## 2020-05-28 ENCOUNTER — VIRTUAL VISIT (OUTPATIENT)
Dept: OTHER | Facility: CLINIC | Age: 65
End: 2020-05-28
Attending: INTERNAL MEDICINE
Payer: COMMERCIAL

## 2020-05-28 DIAGNOSIS — Z12.5 SCREENING FOR PROSTATE CANCER: ICD-10-CM

## 2020-05-28 DIAGNOSIS — E78.5 HYPERLIPIDEMIA LDL GOAL <100: Primary | ICD-10-CM

## 2020-05-28 DIAGNOSIS — E03.9 HYPOTHYROIDISM, UNSPECIFIED TYPE: ICD-10-CM

## 2020-05-28 DIAGNOSIS — R73.01 IMPAIRED FASTING GLUCOSE: ICD-10-CM

## 2020-05-28 PROCEDURE — 99214 OFFICE O/P EST MOD 30 MIN: CPT | Mod: ZP | Performed by: INTERNAL MEDICINE

## 2020-05-28 RX ORDER — LEVOTHYROXINE SODIUM 175 UG/1
175 TABLET ORAL DAILY
Qty: 45 TABLET | Refills: 3 | Status: SHIPPED | OUTPATIENT
Start: 2020-05-28 | End: 2020-08-17

## 2020-05-28 RX ORDER — SIMVASTATIN 40 MG
40 TABLET ORAL AT BEDTIME
Qty: 90 TABLET | Refills: 3 | Status: SHIPPED | OUTPATIENT
Start: 2020-05-28 | End: 2021-08-06

## 2020-05-28 RX ORDER — LEVOTHYROXINE SODIUM 150 UG/1
TABLET ORAL
Qty: 45 TABLET | Refills: 3 | Status: CANCELLED | OUTPATIENT
Start: 2020-05-28

## 2020-05-28 NOTE — PROGRESS NOTES
"Daniel Villanueva is a 64 year old male who is being evaluated via a billable telephone visit.      The patient has been notified of following:     \"This telephone visit will be conducted via a call between you and your physician/provider. We have found that certain health care needs can be provided without the need for a physical exam.  This service lets us provide the care you need with a short phone conversation.  If a prescription is necessary we can send it directly to your pharmacy.  If lab work is needed we can place an order for that and you can then stop by our lab to have the test done at a later time.    Telephone visits are billed at different rates depending on your insurance coverage. During this emergency period, for some insurers they may be billed the same as an in-person visit.  Please reach out to your insurance provider with any questions.    If during the course of the call the physician/provider feels a telephone visit is not appropriate, you will not be charged for this service.\"    Patient has given verbal consent for Telephone visit? Yes     What phone number would you like to be contacted at? Yes, home phone number # telephone visit [phone]    How would you like to obtain your AVS? Mailed   "

## 2020-05-28 NOTE — PROGRESS NOTES
Daniel Villanueva is a 64 year old male who is presenting at the current time to discuss his diagnosi(es) of        Hypothyroidism, unspecified type  Impaired fasting glucose  Screening for prostate cancer  Hyperlipidemia LDL goal <100 .    HPI: Daniel Villanueva is a 64 year old hyperlipidemic, ifg, hypothyroidism patient here for lab f/u.    Review Of Systems  Skin: negative  Eyes: negative  Ears/Nose/Throat: negative  Respiratory: No shortness of breath, dyspnea on exertion, cough, or hemoptysis  Cardiovascular: negative  Gastrointestinal: negative  Genitourinary: negative  Musculoskeletal: negative  Neurologic: negative  Psychiatric: negative  Hematologic/Lymphatic/Immunologic: negative  Endocrine: negative       PAST MEDICAL HISTORY:                  Past Medical History:   Diagnosis Date     Gynecomastia      Hyperlipidemia      Unspecified hypothyroidism        PAST SURGICAL HISTORY:                  Past Surgical History:   Procedure Laterality Date     COLONOSCOPY       PHACOEMULSIFICATION CLEAR CORNEA WITH STANDARD INTRAOCULAR LENS IMPLANT Left 11/29/2017    Procedure: PHACOEMULSIFICATION CLEAR CORNEA WITH STANDARD INTRAOCULAR LENS IMPLANT;  COMPLEX LEFT ATTEMPTED PHACOEMULSIFICATION CLEAR CORNEA  ;  Surgeon: Mo Duncan MD;  Location: [facility]     PHACOEMULSIFICATION CLEAR CORNEA WITH STANDARD INTRAOCULAR LENS IMPLANT Right 12/13/2017    Procedure: PHACOEMULSIFICATION CLEAR CORNEA WITH STANDARD INTRAOCULAR LENS IMPLANT;  COMPLEX RIGHT PHACOEMULSIFICATION CLEAR CORNEA WITH STANDARD INTRAOCULAR LENS IMPLANT   ;  Surgeon: Mo Duncan MD;  Location: [facility]     VITRECTOMY PARSPLANA WITH 23 GAUGE SYSTEM, LENSECTOMY Left 11/29/2017    Procedure: COMBINED VITRECTOMY PARSPLANA, LENSECTOMY;  23 GAUGE VITRECTOMY AND LENSECTOMY;  Surgeon: Jason Nunes MD;  Location: [facility]       CURRENT MEDICATIONS:                  Current Outpatient Medications   Medication Sig Dispense Refill     ASPIRIN PO Take 81 mg by  mouth daily       levothyroxine (SYNTHROID/LEVOTHROID) 150 MCG tablet Alternate 150 mcg with 175 mcg PO every other day. 45 tablet 3     levothyroxine (SYNTHROID/LEVOTHROID) 175 MCG tablet Take 1 tablet (175 mcg) by mouth daily Alternate 150 mcg with 175 mcg PO every other day. 45 tablet 3     Omega-3 Fatty Acids (OMEGA-3 FISH OIL PO)        simvastatin (ZOCOR) 40 MG tablet Take 1 tablet (40 mg) by mouth At Bedtime 90 tablet 3       ALLERGIES:                No Known Allergies    SOCIAL HISTORY:                  Social History     Socioeconomic History     Marital status:      Spouse name: Jennifer     Number of children: 1     Years of education: Not on file     Highest education level: Not on file   Occupational History     Occupation:      Employer: [employer]   Social Needs     Financial resource strain: Not on file     Food insecurity     Worry: Not on file     Inability: Not on file     Transportation needs     Medical: Not on file     Non-medical: Not on file   Tobacco Use     Smoking status: Never Smoker     Smokeless tobacco: Former User     Types: Chew     Tobacco comment: Years ago   Substance and Sexual Activity     Alcohol use: Yes     Comment: rarely     Drug use: No     Sexual activity: Not on file   Lifestyle     Physical activity     Days per week: Not on file     Minutes per session: Not on file     Stress: Not on file   Relationships     Social connections     Talks on phone: Not on file     Gets together: Not on file     Attends Scientologist service: Not on file     Active member of club or organization: Not on file     Attends meetings of clubs or organizations: Not on file     Relationship status: Not on file     Intimate partner violence     Fear of current or ex partner: Not on file     Emotionally abused: Not on file     Physically abused: Not on file     Forced sexual activity: Not on file   Other Topics Concern     Parent/sibling w/ CABG, MI or angioplasty before 65F  55M? Not Asked   Social History Narrative     Not on file       FAMILY HISTORY:                   Family History   Problem Relation Age of Onset     Cancer Maternal Grandmother         lung cancer in a smoker         Physical exam was not performed as this was a [place] Coronavirus mandated billable telephone encounter.    Component      Latest Ref Rng & Units 4/18/2019 8/21/2019 10/30/2019   WBC      4.0 - 11.0 10e9/L 7.4  9.1   RBC Count      4.4 - 5.9 10e12/L 5.07  4.97   Hemoglobin      13.3 - 17.7 g/dL 16.0  15.7   Hematocrit      40.0 - 53.0 % 47.2  46.0   MCV      78 - 100 fl 93  93   MCH      26.5 - 33.0 pg 31.6  31.6   MCHC      31.5 - 36.5 g/dL 33.9  34.1   RDW      10.0 - 15.0 % 14.4  13.4   Platelet Count      150 - 450 10e9/L 219  217   Diff Method       Automated Method  Automated Method   % Neutrophils      % 52.9  55.8   % Lymphocytes      % 28.9  28.1   % Monocytes      % 12.0  10.5   % Eosinophils      % 5.7  4.8   % Basophils      % 0.5  0.5   % Immature Granulocytes      %   0.3   Nucleated RBCs      0 /100   0   Absolute Neutrophil      1.6 - 8.3 10e9/L 3.9  5.1   Absolute Lymphocytes      0.8 - 5.3 10e9/L 2.1  2.6   Absolute Monocytes      0.0 - 1.3 10e9/L 0.9  1.0   Absolute Eosinophils      0.0 - 0.7 10e9/L 0.4  0.4   Absolute Basophils      0.0 - 0.2 10e9/L 0.0  0.1   Abs Immature Granulocytes      0 - 0.4 10e9/L   0.0   Absolute Nucleated RBC         0.0   Sodium      133 - 144 mmol/L 139     Potassium      3.4 - 5.3 mmol/L 4.2     Chloride      94 - 109 mmol/L 111 (H)     Carbon Dioxide      20 - 32 mmol/L 22     Anion Gap      3 - 14 mmol/L 6     Glucose      70 - 99 mg/dL 110 (H)     Urea Nitrogen      7 - 30 mg/dL 18     Creatinine      0.66 - 1.25 mg/dL 0.90     GFR Estimate      >60 mL/min/1.73:m2 90     GFR Estimate If Black      >60 mL/min/1.73:m2 >90     Calcium      8.5 - 10.1 mg/dL 8.8     Bilirubin Direct      0.0 - 0.2 mg/dL 0.1     Bilirubin Total      0.2 - 1.3 mg/dL 0.5      Albumin      3.4 - 5.0 g/dL 3.8     Protein Total      6.8 - 8.8 g/dL 7.1     Alkaline Phosphatase      40 - 150 U/L 51     ALT      0 - 70 U/L 38     AST      0 - 45 U/L 20     Cholesterol      <200 mg/dL 108     Triglycerides      <150 mg/dL 92     HDL Cholesterol      >39 mg/dL 46     LDL Cholesterol Calculated      <100 mg/dL 44     Non HDL Cholesterol      <130 mg/dL 62     Creatinine Urine      mg/dL 63     Albumin Urine mg/L      mg/L 6     Albumin Urine mg/g Cr      0 - 17 mg/g Cr 9.12     TSH      0.40 - 4.00 mU/L 8.55 (H) 0.88 4.16 (H)   PSA      0 - 4 ug/L 1.24     T4 Free      0.76 - 1.46 ng/dL 1.04 1.58 (H) 1.02   Free T3      2.3 - 4.2 pg/mL 2.6 2.8 2.6   Hemoglobin A1C      0 - 5.6 % 5.2       Component      Latest Ref Rng & Units 5/21/2020   WBC      4.0 - 11.0 10e9/L 8.0   RBC Count      4.4 - 5.9 10e12/L 5.16   Hemoglobin      13.3 - 17.7 g/dL 16.2   Hematocrit      40.0 - 53.0 % 47.7   MCV      78 - 100 fl 92   MCH      26.5 - 33.0 pg 31.4   MCHC      31.5 - 36.5 g/dL 34.0   RDW      10.0 - 15.0 % 13.9   Platelet Count      150 - 450 10e9/L 213   Diff Method       Automated Method   % Neutrophils      % 55.3   % Lymphocytes      % 28.0   % Monocytes      % 12.3   % Eosinophils      % 3.9   % Basophils      % 0.5   % Immature Granulocytes      %    Nucleated RBCs      0 /100    Absolute Neutrophil      1.6 - 8.3 10e9/L 4.4   Absolute Lymphocytes      0.8 - 5.3 10e9/L 2.2   Absolute Monocytes      0.0 - 1.3 10e9/L 1.0   Absolute Eosinophils      0.0 - 0.7 10e9/L 0.3   Absolute Basophils      0.0 - 0.2 10e9/L 0.0   Abs Immature Granulocytes      0 - 0.4 10e9/L    Absolute Nucleated RBC          Sodium      133 - 144 mmol/L 136   Potassium      3.4 - 5.3 mmol/L 4.0   Chloride      94 - 109 mmol/L 106   Carbon Dioxide      20 - 32 mmol/L 22   Anion Gap      3 - 14 mmol/L 8   Glucose      70 - 99 mg/dL 108 (H)   Urea Nitrogen      7 - 30 mg/dL 20   Creatinine      0.66 - 1.25 mg/dL 0.99   GFR  Estimate      >60 mL/min/1.73:m2 80   GFR Estimate If Black      >60 mL/min/1.73:m2 >90   Calcium      8.5 - 10.1 mg/dL 9.0   Bilirubin Direct      0.0 - 0.2 mg/dL 0.1   Bilirubin Total      0.2 - 1.3 mg/dL 0.4   Albumin      3.4 - 5.0 g/dL 3.5   Protein Total      6.8 - 8.8 g/dL 7.3   Alkaline Phosphatase      40 - 150 U/L 40   ALT      0 - 70 U/L 49   AST      0 - 45 U/L 23   Cholesterol      <200 mg/dL 137   Triglycerides      <150 mg/dL 89   HDL Cholesterol      >39 mg/dL 45   LDL Cholesterol Calculated      <100 mg/dL 74   Non HDL Cholesterol      <130 mg/dL 92   Creatinine Urine      mg/dL    Albumin Urine mg/L      mg/L    Albumin Urine mg/g Cr      0 - 17 mg/g Cr    TSH      0.40 - 4.00 mU/L 6.49 (H)   PSA      0 - 4 ug/L 1.05   T4 Free      0.76 - 1.46 ng/dL 1.01   Free T3      2.3 - 4.2 pg/mL 2.7   Hemoglobin A1C      0 - 5.6 % 5.5       A/P:    (E78.5) Hyperlipidemia LDL goal <100  (primary encounter diagnosis)  Comment: at goal, no med changes  Plan: simvastatin (ZOCOR) 40 MG tablet           (E03.9) Hypothyroidism, unspecified type  Comment: TSH elevated, increase synthroid to below dose, RTC for lab draw, f/u in [facility]  Plan: levothyroxine (SYNTHROID/LEVOTHROID) 175 MCG         tablet, T3 Free, T4 free, TSH           (R73.01) Impaired fasting glucose  Comment: a1c at goal,   Plan: avoid CHO    (Z12.5) Screening for prostate cancer  Comment: PSA WNL  Plan: annual screening    Greater than one half the twenty five minutes total spent on the pt's visit were spent providing education and counselling to the patient regarding the above matters.

## 2020-07-13 ENCOUNTER — TRANSFERRED RECORDS (OUTPATIENT)
Dept: HEALTH INFORMATION MANAGEMENT | Facility: CLINIC | Age: 65
End: 2020-07-13

## 2020-08-15 DIAGNOSIS — E03.9 HYPOTHYROIDISM, UNSPECIFIED TYPE: ICD-10-CM

## 2020-08-17 RX ORDER — LEVOTHYROXINE SODIUM 175 UG/1
TABLET ORAL
Qty: 90 TABLET | Refills: 2 | Status: SHIPPED | OUTPATIENT
Start: 2020-08-17 | End: 2021-08-06

## 2020-08-17 NOTE — TELEPHONE ENCOUNTER
Unable to refill per [facility] protocol.  Med and pharm loaded.    Kristine LAWN, RN    [facility]  [facility]  Office: [phone]  Fax: [phone]

## 2020-11-17 ENCOUNTER — TRANSFERRED RECORDS (OUTPATIENT)
Dept: HEALTH INFORMATION MANAGEMENT | Facility: CLINIC | Age: 65
End: 2020-11-17

## 2021-01-15 ENCOUNTER — HEALTH MAINTENANCE LETTER (OUTPATIENT)
Age: 66
End: 2021-01-15

## 2021-03-11 NOTE — PATIENT INSTRUCTIONS
Reyes,  As discussed during your telephone visit with Dr. Whitehead on 5/28/20, your thyroid lab (TSH) was elevated.  Dr. Whitehead would like you to increase your synthroid (levothyroxine) dose by :    Alternating 175 mcg with  150 mcg every other day.    He would like you to repeat your thyroid labs in three months (late August or early September) and follow up with him either via virtual visit or in-clinic.    If you have any questions, please feel free to contact me through [employer] or by calling [phone].    Carol Perez RN BSN  For Dr. Oneal Whitehead  [facility]              Advancement Flap (Double) Text: The defect edges were debeveled with a #15 scalpel blade.  Given the location of the defect and the proximity to free margins a double advancement flap was deemed most appropriate.  Using a sterile surgical marker, the appropriate advancement flaps were drawn incorporating the defect and placing the expected incisions within the relaxed skin tension lines where possible.    The area thus outlined was incised deep to adipose tissue with a #15 scalpel blade.  The skin margins were undermined to an appropriate distance in all directions utilizing iris scissors.

## 2021-03-30 ENCOUNTER — IMMUNIZATION (OUTPATIENT)
Dept: NURSING | Facility: CLINIC | Age: 66
End: 2021-03-30
Payer: COMMERCIAL

## 2021-03-30 PROCEDURE — 0001A PR COVID VAC PFIZER DIL RECON 30 MCG/0.3 ML IM: CPT

## 2021-03-30 PROCEDURE — 91300 PR COVID VAC PFIZER DIL RECON 30 MCG/0.3 ML IM: CPT

## 2021-04-20 ENCOUNTER — IMMUNIZATION (OUTPATIENT)
Dept: NURSING | Facility: CLINIC | Age: 66
End: 2021-04-20
Attending: INTERNAL MEDICINE
Payer: COMMERCIAL

## 2021-04-20 PROCEDURE — 0002A PR COVID VAC PFIZER DIL RECON 30 MCG/0.3 ML IM: CPT

## 2021-04-20 PROCEDURE — 91300 PR COVID VAC PFIZER DIL RECON 30 MCG/0.3 ML IM: CPT

## 2021-08-06 DIAGNOSIS — E78.5 HYPERLIPIDEMIA LDL GOAL <100: ICD-10-CM

## 2021-08-06 DIAGNOSIS — E03.9 HYPOTHYROIDISM, UNSPECIFIED TYPE: ICD-10-CM

## 2021-08-06 RX ORDER — SIMVASTATIN 40 MG
TABLET ORAL
Qty: 90 TABLET | Refills: 3 | Status: SHIPPED | OUTPATIENT
Start: 2021-08-06 | End: 2022-08-29

## 2021-08-06 RX ORDER — LEVOTHYROXINE SODIUM 175 UG/1
TABLET ORAL
Qty: 90 TABLET | Refills: 2 | Status: SHIPPED | OUTPATIENT
Start: 2021-08-06 | End: 2022-08-29

## 2021-08-06 NOTE — TELEPHONE ENCOUNTER
"Requested Prescriptions   Pending Prescriptions Disp Refills     levothyroxine (SYNTHROID/LEVOTHROID) 175 MCG tablet [Pharmacy Med Name: LEVOTHYROXINE 0.175MG (175MCG) TABS] 90 tablet 2     Sig: TAKE 1 TABLET BY MOUTH DAILY       Thyroid Protocol Failed - 8/6/2021  9:50 AM        Failed - Recent (12 mo) or future (30 days) visit within the authorizing provider's specialty     Patient has had an office visit with the authorizing provider or a provider within the authorizing providers department within the previous 12 mos or has a future within next 30 days. See \"Patient Info\" tab in inbasket, or \"Choose Columns\" in Meds & Orders section of the refill encounter.              Failed - Normal TSH on file in past 12 months     Recent Labs   Lab Test 05/21/20  0910   TSH 6.49*              Passed - Patient is 12 years or older        Passed - Medication is active on med list           Levothyroxine 175mcg tablet      Last Written Prescription Date:  8/17/20  Last Fill Quantity: 90,   # refills: 2  Last Office Visit: 5/28/20  Future Office visit: not scheduled.    Routing refill request to provider for review/approval because:  Failed lab, patient has not had follow up in 12 months.    THANH GuzmanN, RN-[facility]      "

## 2021-08-06 NOTE — TELEPHONE ENCOUNTER
"Requested Prescriptions   Pending Prescriptions Disp Refills     simvastatin (ZOCOR) 40 MG tablet [Pharmacy Med Name: SIMVASTATIN 40MG TABLETS] 90 tablet 3     Sig: TAKE 1 TABLET(40 MG) BY MOUTH AT BEDTIME       Statins Protocol Failed - 8/6/2021  6:04 AM        Failed - LDL on file in past 12 months     Recent Labs   Lab Test 05/21/20  0910   LDL 74             Failed - Recent (12 mo) or future (30 days) visit within the authorizing provider's specialty     Patient has had an office visit with the authorizing provider or a provider within the authorizing providers department within the previous 12 mos or has a future within next 30 days. See \"Patient Info\" tab in inbasket, or \"Choose Columns\" in Meds & Orders section of the refill encounter.              Passed - No abnormal creatine kinase in past 12 months     No lab results found.             Passed - Medication is active on med list        Passed - Patient is age 18 or older           Last Office Visit: 5/28/20    Routing refill request to provider for review/approval because:  Labs out of range    THANH GuzmanN, RN-[facility]    "

## 2021-09-03 DIAGNOSIS — E78.5 HYPERLIPIDEMIA LDL GOAL <100: ICD-10-CM

## 2021-09-04 ENCOUNTER — HEALTH MAINTENANCE LETTER (OUTPATIENT)
Age: 66
End: 2021-09-04

## 2021-09-07 RX ORDER — SIMVASTATIN 40 MG
TABLET ORAL
Qty: 90 TABLET | Refills: 3 | OUTPATIENT
Start: 2021-09-07

## 2021-09-07 NOTE — TELEPHONE ENCOUNTER
simvastatin (ZOCOR) 40 MG tablet  Last Written Prescription Date:  8/6/21  Last Fill Quantity: 90,  # refills: 3     Should have refill available at same pharmacy. Request denied.  Carol Perez RN BSN  [facility]  [phone]

## 2021-09-08 ENCOUNTER — MYC MEDICAL ADVICE (OUTPATIENT)
Dept: OTHER | Facility: CLINIC | Age: 66
End: 2021-09-08

## 2021-09-08 DIAGNOSIS — E78.5 HYPERLIPIDEMIA LDL GOAL <100: ICD-10-CM

## 2021-09-08 DIAGNOSIS — Z12.5 SCREENING FOR PROSTATE CANCER: Primary | ICD-10-CM

## 2021-09-08 DIAGNOSIS — R73.01 IMPAIRED FASTING GLUCOSE: ICD-10-CM

## 2021-09-08 DIAGNOSIS — Z00.00 PHYSICAL EXAM, ANNUAL: ICD-10-CM

## 2021-09-08 DIAGNOSIS — E03.9 HYPOTHYROIDISM, UNSPECIFIED TYPE: ICD-10-CM

## 2021-09-08 NOTE — TELEPHONE ENCOUNTER
LOV 5/28/20 - patient was to return for repeat thyroid labs in 3 mos.      Routing to scheduling to arrange for:      Fasting labs    Office visit (in clinic) one week later.    Carol Perez RN BSN  [facility]  [phone]

## 2022-02-19 ENCOUNTER — HEALTH MAINTENANCE LETTER (OUTPATIENT)
Age: 67
End: 2022-02-19

## 2022-08-29 ENCOUNTER — TELEPHONE (OUTPATIENT)
Dept: OTHER | Facility: CLINIC | Age: 67
End: 2022-08-29

## 2022-08-29 DIAGNOSIS — E03.9 HYPOTHYROIDISM, UNSPECIFIED TYPE: ICD-10-CM

## 2022-08-29 DIAGNOSIS — E78.5 HYPERLIPIDEMIA LDL GOAL <100: ICD-10-CM

## 2022-08-29 RX ORDER — LEVOTHYROXINE SODIUM 175 UG/1
TABLET ORAL
Qty: 30 TABLET | Refills: 0 | Status: SHIPPED | OUTPATIENT
Start: 2022-08-29 | End: 2022-11-28

## 2022-08-29 RX ORDER — SIMVASTATIN 40 MG
TABLET ORAL
Qty: 30 TABLET | Refills: 0 | Status: SHIPPED | OUTPATIENT
Start: 2022-08-29 | End: 2022-11-28

## 2022-08-29 NOTE — TELEPHONE ENCOUNTER
Please contact patient to arrange for:      Fasting labs    Follow up one week later.  In clinic.

## 2022-08-29 NOTE — TELEPHONE ENCOUNTER
levothyroxine (SYNTHROID/LEVOTHROID) 175 MCG tablet  Last Written Prescription Date:  8/6/21  Last Fill Quantity: 90,  # refills: 2    simvastatin (ZOCOR) 40 MG tablet  Last Written Prescription Date:  8/6/21  Last Fill Quantity: 90,  # refills: 3    Last office visit:  5/28/20    30 day refill loaded for review/signature.  Patient notified via [employer] that appointment is required for further refill.  Follow up orders updated in Epic.

## 2022-08-31 NOTE — TELEPHONE ENCOUNTER
Spoke with patient and scheduled his fasting labs on 10/18/22 and his follow up with Dr. Whitehead on 10/25/22 at [place].

## 2022-10-16 ENCOUNTER — HEALTH MAINTENANCE LETTER (OUTPATIENT)
Age: 67
End: 2022-10-16

## 2022-11-21 ENCOUNTER — LAB (OUTPATIENT)
Dept: LAB | Facility: CLINIC | Age: 67
End: 2022-11-21
Payer: COMMERCIAL

## 2022-11-21 DIAGNOSIS — Z00.00 PHYSICAL EXAM, ANNUAL: ICD-10-CM

## 2022-11-21 DIAGNOSIS — E78.5 HYPERLIPIDEMIA LDL GOAL <100: ICD-10-CM

## 2022-11-21 DIAGNOSIS — Z12.5 SCREENING FOR PROSTATE CANCER: ICD-10-CM

## 2022-11-21 DIAGNOSIS — R73.01 IMPAIRED FASTING GLUCOSE: ICD-10-CM

## 2022-11-21 DIAGNOSIS — E03.9 HYPOTHYROIDISM, UNSPECIFIED TYPE: ICD-10-CM

## 2022-11-21 LAB
ALBUMIN SERPL BCG-MCNC: 4.2 G/DL (ref 3.5–5.2)
ALP SERPL-CCNC: 53 U/L (ref 40–129)
ALT SERPL W P-5'-P-CCNC: 34 U/L (ref 10–50)
ANION GAP SERPL CALCULATED.3IONS-SCNC: 12 MMOL/L (ref 7–15)
AST SERPL W P-5'-P-CCNC: 25 U/L (ref 10–50)
BASOPHILS # BLD AUTO: 0.1 10E3/UL (ref 0–0.2)
BASOPHILS NFR BLD AUTO: 1 %
BILIRUB SERPL-MCNC: 0.3 MG/DL
BUN SERPL-MCNC: 22.9 MG/DL (ref 8–23)
CALCIUM SERPL-MCNC: 9.5 MG/DL (ref 8.8–10.2)
CHLORIDE SERPL-SCNC: 104 MMOL/L (ref 98–107)
CHOLEST SERPL-MCNC: 134 MG/DL
CREAT SERPL-MCNC: 1.07 MG/DL (ref 0.67–1.17)
DEPRECATED HCO3 PLAS-SCNC: 23 MMOL/L (ref 22–29)
EOSINOPHIL # BLD AUTO: 0.3 10E3/UL (ref 0–0.7)
EOSINOPHIL NFR BLD AUTO: 4 %
ERYTHROCYTE [DISTWIDTH] IN BLOOD BY AUTOMATED COUNT: 13.9 % (ref 10–15)
GFR SERPL CREATININE-BSD FRML MDRD: 76 ML/MIN/1.73M2
GLUCOSE SERPL-MCNC: 117 MG/DL (ref 70–99)
HBA1C MFR BLD: 6 % (ref 0–5.6)
HCT VFR BLD AUTO: 48.2 % (ref 40–53)
HDLC SERPL-MCNC: 36 MG/DL
HGB BLD-MCNC: 16 G/DL (ref 13.3–17.7)
LDLC SERPL CALC-MCNC: 75 MG/DL
LYMPHOCYTES # BLD AUTO: 2.3 10E3/UL (ref 0.8–5.3)
LYMPHOCYTES NFR BLD AUTO: 32 %
MCH RBC QN AUTO: 31.2 PG (ref 26.5–33)
MCHC RBC AUTO-ENTMCNC: 33.2 G/DL (ref 31.5–36.5)
MCV RBC AUTO: 94 FL (ref 78–100)
MONOCYTES # BLD AUTO: 0.9 10E3/UL (ref 0–1.3)
MONOCYTES NFR BLD AUTO: 13 %
NEUTROPHILS # BLD AUTO: 3.6 10E3/UL (ref 1.6–8.3)
NEUTROPHILS NFR BLD AUTO: 50 %
NONHDLC SERPL-MCNC: 98 MG/DL
PLATELET # BLD AUTO: 245 10E3/UL (ref 150–450)
POTASSIUM SERPL-SCNC: 4.4 MMOL/L (ref 3.4–5.3)
PROT SERPL-MCNC: 7 G/DL (ref 6.4–8.3)
PSA SERPL-MCNC: 1.11 NG/ML (ref 0–4.5)
RBC # BLD AUTO: 5.13 10E6/UL (ref 4.4–5.9)
SODIUM SERPL-SCNC: 139 MMOL/L (ref 136–145)
T3FREE SERPL-MCNC: 2.7 PG/ML (ref 2–4.4)
T4 FREE SERPL-MCNC: 0.93 NG/DL (ref 0.9–1.7)
TRIGL SERPL-MCNC: 117 MG/DL
TSH SERPL DL<=0.005 MIU/L-ACNC: 16.9 UIU/ML (ref 0.3–4.2)
WBC # BLD AUTO: 7.2 10E3/UL (ref 4–11)

## 2022-11-21 PROCEDURE — G0103 PSA SCREENING: HCPCS

## 2022-11-21 PROCEDURE — 36415 COLL VENOUS BLD VENIPUNCTURE: CPT

## 2022-11-21 PROCEDURE — 84443 ASSAY THYROID STIM HORMONE: CPT

## 2022-11-21 PROCEDURE — 85025 COMPLETE CBC W/AUTO DIFF WBC: CPT

## 2022-11-21 PROCEDURE — 83036 HEMOGLOBIN GLYCOSYLATED A1C: CPT

## 2022-11-21 PROCEDURE — 80053 COMPREHEN METABOLIC PANEL: CPT

## 2022-11-21 PROCEDURE — 80061 LIPID PANEL: CPT

## 2022-11-21 PROCEDURE — 84481 FREE ASSAY (FT-3): CPT

## 2022-11-21 PROCEDURE — 84439 ASSAY OF FREE THYROXINE: CPT

## 2022-11-25 ENCOUNTER — TELEPHONE (OUTPATIENT)
Dept: OTHER | Facility: CLINIC | Age: 67
End: 2022-11-25

## 2022-11-25 NOTE — TELEPHONE ENCOUNTER
Left voicemail with instructions for patient to call back to schedule their appointment(s)    November 25, 2022 , 1:27 PM

## 2022-11-25 NOTE — TELEPHONE ENCOUNTER
Future Appointments   Date Time Provider Department Center   12/5/2022 10:10 AM Oneal Whitehead MD [facility]

## 2022-11-27 DIAGNOSIS — E78.5 HYPERLIPIDEMIA LDL GOAL <100: ICD-10-CM

## 2022-11-27 DIAGNOSIS — E03.9 HYPOTHYROIDISM, UNSPECIFIED TYPE: ICD-10-CM

## 2022-11-28 DIAGNOSIS — E78.5 HYPERLIPIDEMIA LDL GOAL <100: ICD-10-CM

## 2022-11-28 RX ORDER — LEVOTHYROXINE SODIUM 175 UG/1
TABLET ORAL
Qty: 90 TABLET | Refills: 3 | Status: SHIPPED | OUTPATIENT
Start: 2022-11-28 | End: 2022-12-05 | Stop reason: DRUGHIGH

## 2022-11-28 RX ORDER — SIMVASTATIN 40 MG
TABLET ORAL
Qty: 30 TABLET | Refills: 0 | Status: SHIPPED | OUTPATIENT
Start: 2022-11-28 | End: 2022-11-28

## 2022-11-28 RX ORDER — SIMVASTATIN 40 MG
TABLET ORAL
Qty: 90 TABLET | Refills: 0 | Status: SHIPPED | OUTPATIENT
Start: 2022-11-28 | End: 2022-12-05

## 2022-11-28 NOTE — TELEPHONE ENCOUNTER
levothyroxine (SYNTHROID/LEVOTHROID) 175 MCG tablet  Last Written Prescription Date:  8/29/22  Last Fill Quantity: 30,  # refills: 0    Component      Latest Ref Rng & Units 11/21/2022   TSH      0.30 - 4.20 uIU/mL 16.90 (H)       Unable to fill per [facility] protocol.  Medication and pharmacy loaded.    simvastatin (ZOCOR) 40 MG tablet  Last Written Prescription Date:  8/29/22  Last Fill Quantity: 30,  # refills: 0   Prescription approved per [facility] Refill Protocol.      Office visit scheduled 12/5/22

## 2022-12-05 ENCOUNTER — OFFICE VISIT (OUTPATIENT)
Dept: OTHER | Facility: CLINIC | Age: 67
End: 2022-12-05
Attending: INTERNAL MEDICINE
Payer: COMMERCIAL

## 2022-12-05 VITALS
HEIGHT: 70 IN | SYSTOLIC BLOOD PRESSURE: 110 MMHG | DIASTOLIC BLOOD PRESSURE: 70 MMHG | WEIGHT: 305.6 LBS | OXYGEN SATURATION: 94 % | BODY MASS INDEX: 43.75 KG/M2 | HEART RATE: 75 BPM

## 2022-12-05 DIAGNOSIS — R73.01 IMPAIRED FASTING GLUCOSE: ICD-10-CM

## 2022-12-05 DIAGNOSIS — E03.9 HYPOTHYROIDISM, UNSPECIFIED TYPE: ICD-10-CM

## 2022-12-05 DIAGNOSIS — Z12.5 SCREENING FOR PROSTATE CANCER: ICD-10-CM

## 2022-12-05 DIAGNOSIS — E78.5 HYPERLIPIDEMIA LDL GOAL <100: ICD-10-CM

## 2022-12-05 DIAGNOSIS — Z00.00 MEDICARE ANNUAL WELLNESS VISIT, SUBSEQUENT: Primary | ICD-10-CM

## 2022-12-05 PROCEDURE — G0463 HOSPITAL OUTPT CLINIC VISIT: HCPCS

## 2022-12-05 PROCEDURE — 99213 OFFICE O/P EST LOW 20 MIN: CPT | Mod: 25 | Performed by: INTERNAL MEDICINE

## 2022-12-05 PROCEDURE — G0439 PPPS, SUBSEQ VISIT: HCPCS | Performed by: INTERNAL MEDICINE

## 2022-12-05 RX ORDER — SIMVASTATIN 40 MG
40 TABLET ORAL AT BEDTIME
Qty: 90 TABLET | Refills: 3 | Status: SHIPPED | OUTPATIENT
Start: 2022-12-05 | End: 2023-12-14

## 2022-12-05 RX ORDER — LEVOTHYROXINE SODIUM 200 UG/1
200 TABLET ORAL DAILY
Qty: 90 TABLET | Refills: 3 | Status: SHIPPED | OUTPATIENT
Start: 2022-12-05 | End: 2024-01-21

## 2022-12-05 NOTE — PROGRESS NOTES
Daniel Villanueva is a 66 year old male who presents for          Medicare annual wellness visit, subsequent  Screening for prostate cancer  Hyperlipidemia LDL goal <100  Hypothyroidism, unspecified type  Impaired fasting glucose        HPI: Daniel Villanueva is a 66 year old hyperlipidemic, ifg, hypothyroidism patient here for medicare annual physical and med lab f/u.    Current providers caring for this patient include:  Patient Care Team:  Oneal Whitehead MD as PCP - General (Internal Medicine)    Complete Medical and Social history reviewed with patient, outlined below.    Patient Active Problem List   Diagnosis     Hypothyroidism     CARDIOVASCULAR SCREENING; LDL GOAL LESS THAN 130     Hyperlipidemia LDL goal <130     Morbid obesity (H)       Past Medical History:   Diagnosis Date     Gynecomastia      Hyperlipidemia      Unspecified hypothyroidism        Past Surgical History:   Procedure Laterality Date     COLONOSCOPY       PHACOEMULSIFICATION CLEAR CORNEA WITH STANDARD INTRAOCULAR LENS IMPLANT Left 11/29/2017    Procedure: PHACOEMULSIFICATION CLEAR CORNEA WITH STANDARD INTRAOCULAR LENS IMPLANT;  COMPLEX LEFT ATTEMPTED PHACOEMULSIFICATION CLEAR CORNEA  ;  Surgeon: Mo Duncan MD;  Location: [facility]     PHACOEMULSIFICATION CLEAR CORNEA WITH STANDARD INTRAOCULAR LENS IMPLANT Right 12/13/2017    Procedure: PHACOEMULSIFICATION CLEAR CORNEA WITH STANDARD INTRAOCULAR LENS IMPLANT;  COMPLEX RIGHT PHACOEMULSIFICATION CLEAR CORNEA WITH STANDARD INTRAOCULAR LENS IMPLANT   ;  Surgeon: Mo Duncan MD;  Location: [facility]     VITRECTOMY PARSPLANA WITH 23 GAUGE SYSTEM, LENSECTOMY Left 11/29/2017    Procedure: COMBINED VITRECTOMY PARSPLANA, LENSECTOMY;  23 GAUGE VITRECTOMY AND LENSECTOMY;  Surgeon: Jason Nunes MD;  Location: [facility]       Family History   Problem Relation Age of Onset     Cancer Maternal Grandmother         lung cancer in a smoker       Social History     Tobacco Use     Smoking  "status: Never     Smokeless tobacco: Former     Types: Chew     Tobacco comments:     Years ago   Substance Use Topics     Alcohol use: Yes     Comment: rarely       Diet: regular, low salt/low fat  Physical Activity: active without specific exercise program  Depression Screen:    Over the past 2 weeks, patient has felt down, depressed, or hopeless:  No    Over the past 2 weeks, patient has felt little interest or pleasure in doing things: No    Functional ability/Safety screen:  Up and go test (able to get up and walk longer than 30 seconds): Passed  Patient needs assistance with: nothing  Patient's home has the following possible safety concerns: none identified  Patient has concerns about his hearing:  No  Cognitive Screen  Patient repeats three objects (ball, flag, tree)      Clock drawing test:   NORMAL  Recalls three objects after 3 minutes (ball,flag,tree):                                                                                               recalls 3 objects (3 points)    Physical Exam:  /70 (BP Location: Right arm, Patient Position: Chair, Cuff Size: Adult Large)   Pulse 75   Ht 5' 10\" (1.778 m)   Wt 305 lb 9.6 oz (138.6 kg)   SpO2 94%   BMI 43.85 kg/m     Body mass index is 43.85 kg/m .             GENERAL APPEARANCE: healthy, alert and no distress  PSYCH: Affect normal/bright.  Mentation within normal limits.  EYES: conjunctiva clear  HENT: ear canals and TM's normal.  Nose and mouth without ulcers, erythema or lesions  NECK: supple, nontender, no lymphadenopathy  RESP: lungs clear to auscultation - no rales, rhonchi or wheezes  CV: regular rates and rhythm, normal S1 S2, no murmur noted  ABDOMEN:  soft, nontender, no HSM or masses and bowel sounds normal  SKIN: no suspicious lesions or rashes  NEURO: Normal strength and tone,  normal speech and mentation  Extremities: no peripheral edema or tenderness, peripheral pulses normal  MS: extremities normal- no gross deformities noted, no " erythema, FROM noted in all extremities  PSYCH: mentation appears normal  LYMPHATICS: no cervical adenopathy    End of Life Planning:   Patient currently has an advanced directive: Yes.  Practitioner is supportive of decision.    Education/Counseling:   Based on review of the above information, the following items were addressed:      Elevated blood pressure - follow-up plans made    Appropriate preventive services were discussed with this patient, including applicable screening as appropriate for cardiovascular disease, diabetes, osteopenia/osteoporosis, and glaucoma.  As appropriate for age/gender, discussed screening for colorectal cancer, prostate cancer, breast cancer, and cervical cancer.   Checklist reviewing preventive services available has been given to the patient.        Component      Latest Ref Rng & Units 5/21/2020 11/21/2022   WBC      4.0 - 11.0 10e3/uL 8.0 7.2   RBC Count      4.40 - 5.90 10e6/uL 5.16 5.13   Hemoglobin      13.3 - 17.7 g/dL 16.2 16.0   Hematocrit      40.0 - 53.0 % 47.7 48.2   MCV      78 - 100 fL 92 94   MCH      26.5 - 33.0 pg 31.4 31.2   MCHC      31.5 - 36.5 g/dL 34.0 33.2   RDW      10.0 - 15.0 % 13.9 13.9   Platelet Count      150 - 450 10e3/uL 213 245   % Neutrophils      % 55.3 50   % Lymphocytes      % 28.0 32   % Monocytes      % 12.3 13   % Eosinophils      % 3.9 4   % Basophils      % 0.5 1   Absolute Neutrophil      1.6 - 8.3 10e9/L 4.4    Absolute Lymphocytes      0.8 - 5.3 10e9/L 2.2    Absolute Monocytes      0.0 - 1.3 10e9/L 1.0    Absolute Eosinophils      0.0 - 0.7 10e9/L 0.3    Absolute Basophils      0.0 - 0.2 10e9/L 0.0    Diff Method       Automated Method    Absolute Neutrophils      1.6 - 8.3 10e3/uL  3.6   Absolute Lymphocytes      0.8 - 5.3 10e3/uL  2.3   Absolute Monocytes      0.0 - 1.3 10e3/uL  0.9   Absolute Eosinophils      0.0 - 0.7 10e3/uL  0.3   Absolute Basophils      0.0 - 0.2 10e3/uL  0.1   Sodium      136 - 145 mmol/L 136 139   Potassium       3.4 - 5.3 mmol/L  4.4   Chloride      98 - 107 mmol/L  104   Carbon Dioxide (CO2)      22 - 29 mmol/L  23   Anion Gap      7 - 15 mmol/L 8 12   Urea Nitrogen      8.0 - 23.0 mg/dL  22.9   Creatinine      0.67 - 1.17 mg/dL 0.99 1.07   Calcium      8.8 - 10.2 mg/dL 9.0 9.5   Glucose      70 - 99 mg/dL  117 (H)   Alkaline Phosphatase      40 - 129 U/L  53   AST      10 - 50 U/L 23 25   ALT      10 - 50 U/L 49 34   Protein Total      6.4 - 8.3 g/dL 7.3 7.0   Albumin      3.5 - 5.2 g/dL  4.2   Bilirubin Total      <=1.2 mg/dL 0.4 0.3   GFR Estimate      >60 mL/min/1.73m2 80 76   Potassium      3.4 - 5.3 mmol/L 4.0    Chloride      94 - 109 mmol/L 106    Carbon Dioxide      20 - 32 mmol/L 22    Glucose      70 - 99 mg/dL 108 (H)    Urea Nitrogen      7 - 30 mg/dL 20    GFR Estimate If Black      >60 mL/min/1.73:m2 >90    Bilirubin Direct      0.0 - 0.2 mg/dL 0.1    Albumin      3.4 - 5.0 g/dL 3.5    Alkaline Phosphatase      40 - 150 U/L 40    Cholesterol      <200 mg/dL 137 134   Triglycerides      <150 mg/dL 89 117   HDL Cholesterol      >=40 mg/dL 45 36 (L)   LDL Cholesterol Calculated      <=100 mg/dL 74 75   Non HDL Cholesterol      <130 mg/dL 92 98   TSH      0.40 - 4.00 mU/L 6.49 (H)    T4 Free      0.90 - 1.70 ng/dL 1.01 0.93   Free T3      2.0 - 4.4 pg/mL 2.7 2.7   PSA      0 - 4 ug/L 1.05    Hemoglobin A1C      0.0 - 5.6 % 5.5 6.0 (H)   PSA Tumor Marker      0.00 - 4.50 ng/mL  1.11   TSH      0.30 - 4.20 uIU/mL  16.90 (H)        A/P:    (Z00.00) Medicare annual wellness visit, subsequent  (primary encounter diagnosis)  Comment: looks well  Plan: RTC one year for annual physical    (Z12.5) Screening for prostate cancer    Plan: Check PSA in one year, order at next visit    (E78.5) Hyperlipidemia LDL goal <100  Comment: at goal  Plan: simvastatin (ZOCOR) 40 MG tablet, C-Reactive         Protein, High Sensitivity, LipoFit by NMR,         Lipoprotein (a), T3 Free, T4 free, TSH           (E03.9) Hypothyroidism,  unspecified type  Comment: increase synthroid to below dose  Plan: levothyroxine (SYNTHROID/LEVOTHROID) 200 MCG         tablet, T3 Free, T4 free, TSH           (R73.01) Impaired fasting glucose  Comment: avoid CHO  Plan: Hemoglobin A1c             RTC for labs in three months, see me two weeks later.       25 minutes not spent on preventive care during this visit was spent providing ongoing medical care regarding item(s) # 2 onward as delineated above.

## 2022-12-05 NOTE — PROGRESS NOTES
"Patient is here to discuss follow up.    BP (!) 166/81 (BP Location: Right arm, Patient Position: Chair, Cuff Size: Adult Large)   Pulse 75   Ht 5' 10\" (1.778 m)   Wt 305 lb 9.6 oz (138.6 kg)   SpO2 94%   BMI 43.85 kg/m      Questions patient would like addressed today are: N/A.    Refills are needed: N/A    Has homecare services and agency name:  Lola Kennedy  "

## 2023-12-14 ENCOUNTER — TELEPHONE (OUTPATIENT)
Dept: OTHER | Facility: CLINIC | Age: 68
End: 2023-12-14
Payer: COMMERCIAL

## 2023-12-14 DIAGNOSIS — E78.5 HYPERLIPIDEMIA LDL GOAL <100: ICD-10-CM

## 2023-12-14 DIAGNOSIS — Z00.00 MEDICARE ANNUAL WELLNESS VISIT, SUBSEQUENT: Primary | ICD-10-CM

## 2023-12-14 RX ORDER — SIMVASTATIN 40 MG
40 TABLET ORAL AT BEDTIME
Qty: 90 TABLET | OUTPATIENT
Start: 2023-12-14

## 2023-12-14 RX ORDER — SIMVASTATIN 40 MG
40 TABLET ORAL AT BEDTIME
Qty: 30 TABLET | Refills: 0 | Status: SHIPPED | OUTPATIENT
Start: 2023-12-14 | End: 2024-06-10

## 2023-12-14 NOTE — TELEPHONE ENCOUNTER
simvastatin (ZOCOR) 40 MG tablet     Last Written Prescription Date:  12/5/22  Last Fill Quantity: 90,  # refills: 3    Last visit with provider:  12/5/22  Follow up recommended:  3 months        30 day refill loaded for review/signature.  Patient notified via [facility] that appointment is required for further refill.  Follow up orders updated in Epic.

## 2024-01-13 ENCOUNTER — HEALTH MAINTENANCE LETTER (OUTPATIENT)
Age: 69
End: 2024-01-13

## 2024-01-17 DIAGNOSIS — E03.9 HYPOTHYROIDISM, UNSPECIFIED TYPE: ICD-10-CM

## 2024-01-17 RX ORDER — LEVOTHYROXINE SODIUM 200 UG/1
200 TABLET ORAL DAILY
Qty: 90 TABLET | Refills: 3 | OUTPATIENT
Start: 2024-01-17

## 2024-01-17 NOTE — TELEPHONE ENCOUNTER
levothyroxine (SYNTHROID/LEVOTHROID) 200 MCG tablet     Last Written Prescription Date:  12/5/23  Last Fill Quantity: 90,  # refills: 3    LOV 12/5/2022      Rx request denied - patient needs an appointment.

## 2024-01-21 ENCOUNTER — MYC REFILL (OUTPATIENT)
Dept: OTHER | Facility: CLINIC | Age: 69
End: 2024-01-21
Payer: COMMERCIAL

## 2024-01-21 DIAGNOSIS — E03.9 HYPOTHYROIDISM, UNSPECIFIED TYPE: ICD-10-CM

## 2024-01-22 RX ORDER — LEVOTHYROXINE SODIUM 200 UG/1
200 TABLET ORAL DAILY
Qty: 90 TABLET | Refills: 0 | Status: SHIPPED | OUTPATIENT
Start: 2024-01-22 | End: 2024-05-31

## 2024-01-22 NOTE — TELEPHONE ENCOUNTER
levothyroxine (SYNTHROID/LEVOTHROID) 200 MCG tablet   Last Written Prescription Date:  12/5/22  Last Fill Quantity: 90,  # refills: 3    Last visit with provider:  12/5/22    Patient requesting 90 day supply until he returns in March.    Carol Perez RN BSN  [facility]  Phone: [phone]  Fax: [phone]

## 2024-05-01 DIAGNOSIS — E03.9 HYPOTHYROIDISM, UNSPECIFIED TYPE: ICD-10-CM

## 2024-05-01 RX ORDER — LEVOTHYROXINE SODIUM 200 UG/1
200 TABLET ORAL DAILY
Qty: 90 TABLET | Refills: 0 | OUTPATIENT
Start: 2024-05-01

## 2024-05-01 NOTE — TELEPHONE ENCOUNTER
levothyroxine (SYNTHROID/LEVOTHROID) 200 MCG tablet   Last Written Prescription Date:  1/22/24  Last Fill Quantity: 90,  # refills: 0    Patient last requested 90 day supply on 1/21/24 to get him through until he returned to [place] in March.    Rx request denied.  Patient needs an appointment.

## 2024-05-31 ENCOUNTER — TELEPHONE (OUTPATIENT)
Dept: OTHER | Facility: CLINIC | Age: 69
End: 2024-05-31
Payer: COMMERCIAL

## 2024-05-31 DIAGNOSIS — E03.9 HYPOTHYROIDISM, UNSPECIFIED TYPE: ICD-10-CM

## 2024-05-31 RX ORDER — LEVOTHYROXINE SODIUM 200 UG/1
200 TABLET ORAL DAILY
Qty: 90 TABLET | Refills: 0 | Status: SHIPPED | OUTPATIENT
Start: 2024-05-31 | End: 2024-07-25

## 2024-05-31 NOTE — TELEPHONE ENCOUNTER
levothyroxine (SYNTHROID/LEVOTHROID) 200 MCG tablet  Q90 R0  Sent to   [id number] - [place], [place] - [address] AT [facility] & [facility]     E-Prescribing Status: Receipt confirmed by pharmacy (5/31/2024 12:05 PM CDT)     Carol Perez RN BSN  [facility]  Phone: [phone]  Fax: [phone]

## 2024-05-31 NOTE — TELEPHONE ENCOUNTER
Patient scheduled for the below recommended appointments, patient will be going out of town on 06/01/24 and wants to ensure he can  the requested medication from his pharmacy prior to leaving as he only has about 5 pills left and will not be returning to [place] until 06/28/24. Reviewed the below notes with patient, routing back to RN to review and provide this information to Dr Whitehead if necessary.     Encounter can be signed.

## 2024-05-31 NOTE — TELEPHONE ENCOUNTER
LOV 12/5/2022  Follow up recommended: RTC One year for annual physical    Lab orders extended.    Rx loaded for review/signature.    Will route to scheduling to arrange for:    Fasting labs  Follow up TWO weeks later - In clinic.     Appt Note:   LOV 12/5/22.  Annual Physical.  Labs 2 weeks prior.

## 2024-05-31 NOTE — TELEPHONE ENCOUNTER
[facility]    Who is the name of the provider?:  DANIELLE AZEVEDO   What is the location you see this provider at/preferred location?: [place]  Person calling / Facility: Daniel Villanueva  Phone number:  [phone] (home)   Nurse call back needed:  NO     Reason for call:  Patient running low on     levothyroxine (SYNTHROID/LEVOTHROID) 200 MCG tablet [79112]    Asking to schedule a follow up, no active requests. Wondering if he needs labs, imaging, etc.    Pharmacy location:  [id number] [address] AT [facility] & [facility]  Outside Imaging: n/a   Can we leave a detailed message on this number?  YES     5/31/2024, 11:42 AM

## 2024-06-02 ENCOUNTER — PATIENT OUTREACH (OUTPATIENT)
Dept: CARE COORDINATION | Facility: CLINIC | Age: 69
End: 2024-06-02
Payer: COMMERCIAL

## 2024-06-10 DIAGNOSIS — E78.5 HYPERLIPIDEMIA LDL GOAL <100: ICD-10-CM

## 2024-06-10 RX ORDER — SIMVASTATIN 40 MG
40 TABLET ORAL AT BEDTIME
Qty: 90 TABLET | Refills: 0 | Status: SHIPPED | OUTPATIENT
Start: 2024-06-10 | End: 2024-07-25

## 2024-06-10 NOTE — TELEPHONE ENCOUNTER
simvastatin (ZOCOR) 40 MG tablet   Last Written Prescription Date:  12/14/23  Last Fill Quantity: 30,  # refills: 0     Last office visit: 12/5/2022;     Future office visit:  Future Appointments   Date Time Provider Department Center   7/2/2024  8:15 AM [facility]    7/16/2024  9:40 AM Oneal Whitehead MD [facility]        Unable to fill per [facility] [place].  Medication and pharmacy loaded.

## 2024-07-02 ENCOUNTER — LAB (OUTPATIENT)
Dept: LAB | Facility: CLINIC | Age: 69
End: 2024-07-02
Payer: COMMERCIAL

## 2024-07-02 DIAGNOSIS — Z00.00 MEDICARE ANNUAL WELLNESS VISIT, SUBSEQUENT: ICD-10-CM

## 2024-07-02 LAB
ALBUMIN SERPL BCG-MCNC: 4.1 G/DL (ref 3.5–5.2)
ALP SERPL-CCNC: 59 U/L (ref 40–150)
ALT SERPL W P-5'-P-CCNC: 35 U/L (ref 0–70)
ANION GAP SERPL CALCULATED.3IONS-SCNC: 8 MMOL/L (ref 7–15)
AST SERPL W P-5'-P-CCNC: 29 U/L (ref 0–45)
BASOPHILS # BLD AUTO: 0 10E3/UL (ref 0–0.2)
BASOPHILS NFR BLD AUTO: 0 %
BILIRUB SERPL-MCNC: 0.2 MG/DL
BUN SERPL-MCNC: 21.1 MG/DL (ref 8–23)
CALCIUM SERPL-MCNC: 9.8 MG/DL (ref 8.8–10.2)
CHLORIDE SERPL-SCNC: 104 MMOL/L (ref 98–107)
CHOLEST SERPL-MCNC: 101 MG/DL
CREAT SERPL-MCNC: 0.97 MG/DL (ref 0.67–1.17)
CRP SERPL HS-MCNC: 2.19 MG/L
DEPRECATED HCO3 PLAS-SCNC: 26 MMOL/L (ref 22–29)
EGFRCR SERPLBLD CKD-EPI 2021: 85 ML/MIN/1.73M2
EOSINOPHIL # BLD AUTO: 0.4 10E3/UL (ref 0–0.7)
EOSINOPHIL NFR BLD AUTO: 4 %
ERYTHROCYTE [DISTWIDTH] IN BLOOD BY AUTOMATED COUNT: 13.1 % (ref 10–15)
FASTING STATUS PATIENT QL REPORTED: YES
FASTING STATUS PATIENT QL REPORTED: YES
GLUCOSE SERPL-MCNC: 171 MG/DL (ref 70–99)
HBA1C MFR BLD: 6.7 % (ref 0–5.6)
HCT VFR BLD AUTO: 50.5 % (ref 40–53)
HDLC SERPL-MCNC: 42 MG/DL
HGB BLD-MCNC: 16.6 G/DL (ref 13.3–17.7)
IMM GRANULOCYTES # BLD: 0 10E3/UL
IMM GRANULOCYTES NFR BLD: 0 %
LDLC SERPL CALC-MCNC: 31 MG/DL
LYMPHOCYTES # BLD AUTO: 2.4 10E3/UL (ref 0.8–5.3)
LYMPHOCYTES NFR BLD AUTO: 22 %
MCH RBC QN AUTO: 30.9 PG (ref 26.5–33)
MCHC RBC AUTO-ENTMCNC: 32.9 G/DL (ref 31.5–36.5)
MCV RBC AUTO: 94 FL (ref 78–100)
MONOCYTES # BLD AUTO: 1.3 10E3/UL (ref 0–1.3)
MONOCYTES NFR BLD AUTO: 12 %
NEUTROPHILS # BLD AUTO: 7.1 10E3/UL (ref 1.6–8.3)
NEUTROPHILS NFR BLD AUTO: 63 %
NONHDLC SERPL-MCNC: 59 MG/DL
PLATELET # BLD AUTO: 236 10E3/UL (ref 150–450)
POTASSIUM SERPL-SCNC: 4.9 MMOL/L (ref 3.4–5.3)
PROT SERPL-MCNC: 7 G/DL (ref 6.4–8.3)
PSA SERPL DL<=0.01 NG/ML-MCNC: 1.33 NG/ML (ref 0–4.5)
RBC # BLD AUTO: 5.38 10E6/UL (ref 4.4–5.9)
SODIUM SERPL-SCNC: 138 MMOL/L (ref 135–145)
T3FREE SERPL-MCNC: 3.2 PG/ML (ref 2–4.4)
T4 FREE SERPL-MCNC: 1.5 NG/DL (ref 0.9–1.7)
TRIGL SERPL-MCNC: 140 MG/DL
TSH SERPL DL<=0.005 MIU/L-ACNC: 3.59 UIU/ML (ref 0.3–4.2)
WBC # BLD AUTO: 11.3 10E3/UL (ref 4–11)

## 2024-07-02 PROCEDURE — 83036 HEMOGLOBIN GLYCOSYLATED A1C: CPT

## 2024-07-02 PROCEDURE — 84481 FREE ASSAY (FT-3): CPT

## 2024-07-02 PROCEDURE — 84443 ASSAY THYROID STIM HORMONE: CPT

## 2024-07-02 PROCEDURE — 85025 COMPLETE CBC W/AUTO DIFF WBC: CPT

## 2024-07-02 PROCEDURE — 86141 C-REACTIVE PROTEIN HS: CPT

## 2024-07-02 PROCEDURE — 80053 COMPREHEN METABOLIC PANEL: CPT

## 2024-07-02 PROCEDURE — G0103 PSA SCREENING: HCPCS

## 2024-07-02 PROCEDURE — 84439 ASSAY OF FREE THYROXINE: CPT

## 2024-07-02 PROCEDURE — 36415 COLL VENOUS BLD VENIPUNCTURE: CPT

## 2024-07-02 PROCEDURE — 80061 LIPID PANEL: CPT

## 2024-07-25 ENCOUNTER — OFFICE VISIT (OUTPATIENT)
Dept: OTHER | Facility: CLINIC | Age: 69
End: 2024-07-25
Attending: INTERNAL MEDICINE
Payer: COMMERCIAL

## 2024-07-25 VITALS
SYSTOLIC BLOOD PRESSURE: 149 MMHG | DIASTOLIC BLOOD PRESSURE: 89 MMHG | BODY MASS INDEX: 46.49 KG/M2 | OXYGEN SATURATION: 95 % | HEART RATE: 84 BPM | WEIGHT: 315 LBS

## 2024-07-25 DIAGNOSIS — E11.9 TYPE 2 DIABETES, HBA1C GOAL < 7% (H): ICD-10-CM

## 2024-07-25 DIAGNOSIS — I10 ESSENTIAL HYPERTENSION: ICD-10-CM

## 2024-07-25 DIAGNOSIS — Z12.5 SCREENING FOR PROSTATE CANCER: ICD-10-CM

## 2024-07-25 DIAGNOSIS — E03.9 HYPOTHYROIDISM, UNSPECIFIED TYPE: ICD-10-CM

## 2024-07-25 DIAGNOSIS — E78.5 HYPERLIPIDEMIA LDL GOAL <100: ICD-10-CM

## 2024-07-25 DIAGNOSIS — Z00.00 MEDICARE ANNUAL WELLNESS VISIT, SUBSEQUENT: Primary | ICD-10-CM

## 2024-07-25 PROCEDURE — 99214 OFFICE O/P EST MOD 30 MIN: CPT | Mod: 25 | Performed by: INTERNAL MEDICINE

## 2024-07-25 PROCEDURE — G0463 HOSPITAL OUTPT CLINIC VISIT: HCPCS | Performed by: INTERNAL MEDICINE

## 2024-07-25 PROCEDURE — G0439 PPPS, SUBSEQ VISIT: HCPCS | Performed by: INTERNAL MEDICINE

## 2024-07-25 RX ORDER — LEVOTHYROXINE SODIUM 200 UG/1
200 TABLET ORAL DAILY
Qty: 90 TABLET | Refills: 3 | Status: SHIPPED | OUTPATIENT
Start: 2024-07-25

## 2024-07-25 RX ORDER — LISINOPRIL 10 MG/1
10 TABLET ORAL DAILY
Qty: 90 TABLET | Refills: 3 | Status: SHIPPED | OUTPATIENT
Start: 2024-07-25

## 2024-07-25 RX ORDER — LISINOPRIL 5 MG/1
5 TABLET ORAL DAILY
Qty: 90 TABLET | Refills: 3 | Status: CANCELLED | OUTPATIENT
Start: 2024-07-25

## 2024-07-25 RX ORDER — SIMVASTATIN 40 MG
40 TABLET ORAL AT BEDTIME
Qty: 90 TABLET | Refills: 3 | Status: SHIPPED | OUTPATIENT
Start: 2024-07-25

## 2024-07-25 NOTE — PROGRESS NOTES
VASCULAR MEDICINE FOLLOW UP VISIT          A/P:     (Z00.00) Medicare annual wellness visit, subsequent  (primary encounter diagnosis)    Comment: looks well    Plan: RTC one year for annual physical       (Z12.5) Screening for prostate cancer    Plan: OFFICE/OUTPT VISIT,EST,LEVL IV, Check PSA in one year       (E78.5) Hyperlipidemia LDL goal <100    Comment: at goal    Plan: OFFICE/OUTPT VISIT,EST,LEVL IV, simvastatin (ZOCOR) 40 MG tablet, C-Reactive         Protein, High Sensitivity, LipoFit by NMR,         Lipoprotein (a), T3 Free, T4 free, TSH            (E03.9) Hypothyroidism, unspecified type    Comment: increase synthroid to below dose    Plan: OFFICE/OUTPT VISIT,EST,LEVL IV, levothyroxine (SYNTHROID/LEVOTHROID) 200 MCG         tablet, T3 Free, T4 free, TSH              (E11.9) New diagnosis of type 2 diabetes, HbA1c goal < 7% (H)    Comment: This is a new dx. See a diabetic educator, start metformin, check labs in three months.    Plan: metFORMIN (GLUCOPHAGE) 500 MG tablet, Albumin         Random Urine Quantitative with Creat Ratio,         Comprehensive metabolic panel, Hemoglobin A1c,         OFFICE/OUTPT VISIT,EST,LEVL IV, Adult Diabetes         Education  Referral, lisinopril         (ZESTRIL) 10 MG tablet              (I10) Essential hypertension    Comment: start the below    Plan: lisinopril (ZESTRIL) 10 MG tablet, OFFICE/OUTPT VISIT,EST,LEVL IV                           RTC for labs in three months, see me two weeks later.       The longitudinal care of plan for Reyes was addressed during this visit. Due to added complexity of care, we will continue to support Daniel Villanueva  and the subsequent management of these conditions and with ongoing continuity of care for these conditions.           35 minutes not spent on preventive care during this visit was spent providing ongoing medical care regarding item(s) # 2 onward as delineated above.      Daniel Villanueva is a 68 year old male who presents  for          Medicare annual wellness visit, subsequent  Hypothyroidism, unspecified type  Hyperlipidemia LDL goal <100  Screening for prostate cancer  Type 2 diabetes, HbA1c goal < 7% (H)  Essential hypertension             HPI: Daniel Villanueva is a 68 year old hyperlipidemic, former ifg patient who is now newly diabetic based upon an a1c of 6.7%, hypothyroidism patient here for medicare annual physical and med lab f/u.     Current providers caring for this patient include:  Patient Care Team:  Oneal Whitehead MD as PCP - General (Internal Medicine)     Complete Medical and Social history reviewed with patient, outlined below.         Patient Active Problem List   Diagnosis    Hypothyroidism    CARDIOVASCULAR SCREENING; LDL GOAL LESS THAN 130    Hyperlipidemia LDL goal <130    Morbid obesity (H)       PAST MEDICAL HISTORY:                  Past Medical History:   Diagnosis Date    Gynecomastia     Hyperlipidemia     Unspecified hypothyroidism        PAST SURGICAL HISTORY:                  Past Surgical History:   Procedure Laterality Date    COLONOSCOPY      PHACOEMULSIFICATION CLEAR CORNEA WITH STANDARD INTRAOCULAR LENS IMPLANT Left 11/29/2017    Procedure: PHACOEMULSIFICATION CLEAR CORNEA WITH STANDARD INTRAOCULAR LENS IMPLANT;  COMPLEX LEFT ATTEMPTED PHACOEMULSIFICATION CLEAR CORNEA  ;  Surgeon: Mo Duncan MD;  Location: [facility]    PHACOEMULSIFICATION CLEAR CORNEA WITH STANDARD INTRAOCULAR LENS IMPLANT Right 12/13/2017    Procedure: PHACOEMULSIFICATION CLEAR CORNEA WITH STANDARD INTRAOCULAR LENS IMPLANT;  COMPLEX RIGHT PHACOEMULSIFICATION CLEAR CORNEA WITH STANDARD INTRAOCULAR LENS IMPLANT   ;  Surgeon: Mo Duncan MD;  Location: [facility]    VITRECTOMY PARSPLANA WITH 23 GAUGE SYSTEM, LENSECTOMY Left 11/29/2017    Procedure: COMBINED VITRECTOMY PARSPLANA, LENSECTOMY;  23 GAUGE VITRECTOMY AND LENSECTOMY;  Surgeon: Jason Nunes MD;  Location: [facility]       CURRENT MEDICATIONS:                   Current Outpatient Medications   Medication Sig Dispense Refill    levothyroxine (SYNTHROID/LEVOTHROID) 200 MCG tablet Take 1 tablet (200 mcg) by mouth daily 90 tablet 3    lisinopril (ZESTRIL) 10 MG tablet Take 1 tablet (10 mg) by mouth daily 90 tablet 3    metFORMIN (GLUCOPHAGE) 500 MG tablet Take 1 tablet (500 mg) by mouth 2 times daily (with meals) 180 tablet 3    simvastatin (ZOCOR) 40 MG tablet Take 1 tablet (40 mg) by mouth at bedtime 90 tablet 3    ASPIRIN PO Take 81 mg by mouth daily         ALLERGIES:                No Known Allergies    SOCIAL HISTORY:                  Social History     Socioeconomic History    Marital status:      Spouse name: Jennifer    Number of children: 1    Years of education: Not on file    Highest education level: Not on file   Occupational History    Occupation:      Employer: [employer]   Tobacco Use    Smoking status: Never    Smokeless tobacco: Former     Types: Chew    Tobacco comments:     Years ago   Substance and Sexual Activity    Alcohol use: Yes     Comment: rarely    Drug use: No    Sexual activity: Not on file   Other Topics Concern    Parent/sibling w/ CABG, MI or angioplasty before 65F 55M? Not Asked   Social History Narrative    Not on file     Social Determinants of Health     Financial Resource Strain: Not on file   Food Insecurity: Not on file   Transportation Needs: Not on file   Physical Activity: Not on file   Stress: Not on file   Social Connections: Not on file   Interpersonal Safety: Not on file   Housing Stability: Not on file       FAMILY HISTORY:                   Family History   Problem Relation Age of Onset    Cancer Maternal Grandmother         lung cancer in a smoker              Diet: regular, low salt/low fat  Physical Activity: active without specific exercise program  Depression Screen:    Over the past 2 weeks, patient has felt down, depressed, or hopeless:  No    Over the past 2 weeks, patient has felt little  interest or pleasure in doing things: No     Functional ability/Safety screen:  Up and go test (able to get up and walk longer than 30 seconds): Passed  Patient needs assistance with: nothing  Patient's home has the following possible safety concerns: none identified  Patient has concerns about his hearing:  No  Cognitive Screen  Patient repeats three objects (ball, flag, tree)      Clock drawing test:   NORMAL  Recalls three objects after 3 minutes (ball,flag,tree):                                                                                               recalls 3 objects (3 points)      Review Of Systems  Skin: negative  Eyes: negative  Ears/Nose/Throat: negative  Respiratory: No shortness of breath, dyspnea on exertion, cough, or hemoptysis  Cardiovascular: negative  Gastrointestinal: negative  Genitourinary: negative  Musculoskeletal: negative  Neurologic: negative  Psychiatric: negative  Hematologic/Lymphatic/Immunologic: negative  Endocrine: negative       Physical Exam:BP (!) 149/89 (BP Location: Right arm, Patient Position: Chair, Cuff Size: Adult Large)   Pulse 84   Wt 324 lb (147 kg)   SpO2 95%   BMI 46.49 kg/m          Physical exam Reveals:    O/P: WNL  HEENT: WNL  NECK: No JVD, thyromegaly, or lymphadenopathy  HEART: RRR, no murmurs, gallops, or rubs  LUNGS: CTA bilaterally without rales, wheezes, or rhonchi  GI: NABS, nondistended, nontender, soft  EXT:without cyanosis, clubbing, or edema  NEURO: nonfocal  : no flank tenderness       End of Life Planning:   Patient currently has an advanced directive: Yes.  Practitioner is supportive of decision.     Education/Counseling:   Based on review of the above information, the following items were addressed:      Elevated blood pressure - follow-up plans made     Appropriate preventive services were discussed with this patient, including applicable screening as appropriate for cardiovascular disease, diabetes, osteopenia/osteoporosis, and glaucoma.   As appropriate for age/gender, discussed screening for colorectal cancer, prostate cancer, breast cancer, and cervical cancer.   Checklist reviewing preventive services available has been given to the patient.           Component      Latest Ref Rng & Units 5/21/2020 11/21/2022   WBC      4.0 - 11.0 10e3/uL 8.0 7.2   RBC Count      4.40 - 5.90 10e6/uL 5.16 5.13   Hemoglobin      13.3 - 17.7 g/dL 16.2 16.0   Hematocrit      40.0 - 53.0 % 47.7 48.2   MCV      78 - 100 fL 92 94   MCH      26.5 - 33.0 pg 31.4 31.2   MCHC      31.5 - 36.5 g/dL 34.0 33.2   RDW      10.0 - 15.0 % 13.9 13.9   Platelet Count      150 - 450 10e3/uL 213 245   % Neutrophils      % 55.3 50   % Lymphocytes      % 28.0 32   % Monocytes      % 12.3 13   % Eosinophils      % 3.9 4   % Basophils      % 0.5 1   Absolute Neutrophil      1.6 - 8.3 10e9/L 4.4     Absolute Lymphocytes      0.8 - 5.3 10e9/L 2.2     Absolute Monocytes      0.0 - 1.3 10e9/L 1.0     Absolute Eosinophils      0.0 - 0.7 10e9/L 0.3     Absolute Basophils      0.0 - 0.2 10e9/L 0.0     Diff Method       Automated Method     Absolute Neutrophils      1.6 - 8.3 10e3/uL   3.6   Absolute Lymphocytes      0.8 - 5.3 10e3/uL   2.3   Absolute Monocytes      0.0 - 1.3 10e3/uL   0.9   Absolute Eosinophils      0.0 - 0.7 10e3/uL   0.3   Absolute Basophils      0.0 - 0.2 10e3/uL   0.1   Sodium      136 - 145 mmol/L 136 139   Potassium      3.4 - 5.3 mmol/L   4.4   Chloride      98 - 107 mmol/L   104   Carbon Dioxide (CO2)      22 - 29 mmol/L   23   Anion Gap      7 - 15 mmol/L 8 12   Urea Nitrogen      8.0 - 23.0 mg/dL   22.9   Creatinine      0.67 - 1.17 mg/dL 0.99 1.07   Calcium      8.8 - 10.2 mg/dL 9.0 9.5   Glucose      70 - 99 mg/dL   117 (H)   Alkaline Phosphatase      40 - 129 U/L   53   AST      10 - 50 U/L 23 25   ALT      10 - 50 U/L 49 34   Protein Total      6.4 - 8.3 g/dL 7.3 7.0   Albumin      3.5 - 5.2 g/dL   4.2   Bilirubin Total      <=1.2 mg/dL 0.4 0.3   GFR Estimate       >60 mL/min/1.73m2 80 76   Potassium      3.4 - 5.3 mmol/L 4.0     Chloride      94 - 109 mmol/L 106     Carbon Dioxide      20 - 32 mmol/L 22     Glucose      70 - 99 mg/dL 108 (H)     Urea Nitrogen      7 - 30 mg/dL 20     GFR Estimate If Black      >60 mL/min/1.73:m2 >90     Bilirubin Direct      0.0 - 0.2 mg/dL 0.1     Albumin      3.4 - 5.0 g/dL 3.5     Alkaline Phosphatase      40 - 150 U/L 40     Cholesterol      <200 mg/dL 137 134   Triglycerides      <150 mg/dL 89 117   HDL Cholesterol      >=40 mg/dL 45 36 (L)   LDL Cholesterol Calculated      <=100 mg/dL 74 75   Non HDL Cholesterol      <130 mg/dL 92 98   TSH      0.40 - 4.00 mU/L 6.49 (H)     T4 Free      0.90 - 1.70 ng/dL 1.01 0.93   Free T3      2.0 - 4.4 pg/mL 2.7 2.7   PSA      0 - 4 ug/L 1.05     Hemoglobin A1C      0.0 - 5.6 % 5.5 6.0 (H)   PSA Tumor Marker      0.00 - 4.50 ng/mL   1.11   TSH      0.30 - 4.20 uIU/mL   16.90 (H)        Component      Latest Ref [facility] 7/2/2024  8:10 AM   WBC      4.0 - 11.0 10e3/uL 11.3 (H)    RBC Count      4.40 - 5.90 10e6/uL 5.38    Hemoglobin      13.3 - 17.7 g/dL 16.6    Hematocrit      40.0 - 53.0 % 50.5    MCV      78 - 100 fL 94    MCH      26.5 - 33.0 pg 30.9    MCHC      31.5 - 36.5 g/dL 32.9    RDW      10.0 - 15.0 % 13.1    Platelet Count      150 - 450 10e3/uL 236    % Neutrophils      % 63    % Lymphocytes      % 22    % Monocytes      % 12    % Eosinophils      % 4    % Basophils      % 0    % Immature Granulocytes      % 0    Absolute Neutrophils      1.6 - 8.3 10e3/uL 7.1    Absolute Lymphocytes      0.8 - 5.3 10e3/uL 2.4    Absolute Monocytes      0.0 - 1.3 10e3/uL 1.3    Absolute Eosinophils      0.0 - 0.7 10e3/uL 0.4    Absolute Basophils      0.0 - 0.2 10e3/uL 0.0    Absolute Immature Granulocytes      <=0.4 10e3/uL 0.0    Sodium      135 - 145 mmol/L 138    Potassium      3.4 - 5.3 mmol/L 4.9    Carbon Dioxide (CO2)      22 - 29 mmol/L 26    Anion Gap      7 - 15 mmol/L 8    Urea Nitrogen      8.0  - 23.0 mg/dL 21.1    Creatinine      0.67 - 1.17 mg/dL 0.97    GFR Estimate      >60 mL/min/1.73m2 85    Calcium      8.8 - 10.2 mg/dL 9.8    Chloride      98 - 107 mmol/L 104    Glucose      70 - 99 mg/dL 171 (H)    Alkaline Phosphatase      40 - 150 U/L 59    AST      0 - 45 U/L 29    ALT      0 - 70 U/L 35    Protein Total      6.4 - 8.3 g/dL 7.0    Albumin      3.5 - 5.2 g/dL 4.1    Bilirubin Total      <=1.2 mg/dL 0.2    Patient Fasting? Yes    Patient Fasting? Yes    Cholesterol      <200 mg/dL 101    Triglycerides      <150 mg/dL 140    HDL Cholesterol      >=40 mg/dL 42    LDL Cholesterol Calculated      <=100 mg/dL 31    Non HDL Cholesterol      <130 mg/dL 59    C-Reactive Protein High Sensitivity 2.19    Hemoglobin A1C      0.0 - 5.6 % 6.7 (H)    Free T3      2.0 - 4.4 pg/mL 3.2    T4 Free      0.90 - 1.70 ng/dL 1.50    TSH      0.30 - 4.20 uIU/mL 3.59    PSA Tumor Marker      0.00 - 4.50 ng/mL 1.33       Legend:  (H) High         Component      Latest Ref Rn 6/21/2005  7:46 AM 5/30/2009  10:06 AM 11/30/2011  7:58 AM 2/21/2012  7:42 AM 12/12/2012  7:42 AM 12/31/2014  7:45 AM   Hemoglobin A1C      0.0 - 5.6 % 5.9  5.1  5.1  5.4  5.7  5.6      Component      Latest Ref Rng 12/28/2015  7:45 AM 2/17/2017  7:23 AM 10/31/2017  7:31 AM 4/18/2019  8:34 AM 5/21/2020  9:10 AM 11/21/2022  9:27 AM   Hemoglobin A1C      0.0 - 5.6 % 5.7  5.5  5.7  5.2  5.5  6.0 (H)      Component      Latest Ref Rng 7/2/2024  8:10 AM   Hemoglobin A1C      0.0 - 5.6 % 6.7 (H)       Legend:  (H) High

## 2024-07-25 NOTE — PROGRESS NOTES
[facility]        Patient is here for a  follow up.    Pt is currently taking Aspirin and Statin.    BP (!) 149/89 (BP Location: Right arm, Patient Position: Chair, Cuff Size: Adult Large)   Pulse 84   Wt 324 lb (147 kg)   SpO2 95%   BMI 46.49 kg/m      The provider has been notified that the patient has no concerns.     Questions patient would like addressed today are: N/A.    Refills are needed: N/A    Has homecare services and agency name:  Lola Cooney MA

## 2024-08-22 ENCOUNTER — TELEPHONE (OUTPATIENT)
Dept: OTHER | Facility: CLINIC | Age: 69
End: 2024-08-22

## 2024-08-22 DIAGNOSIS — E11.9 DIABETES MELLITUS, TYPE 2 (H): Primary | ICD-10-CM

## 2024-08-22 RX ORDER — BLOOD-GLUCOSE METER
EACH MISCELLANEOUS
Qty: 1 KIT | Refills: 0 | Status: SHIPPED | OUTPATIENT
Start: 2024-08-22

## 2024-08-22 NOTE — TELEPHONE ENCOUNTER
Returned call to Reyes to discuss what strips and monitor he uses- per chart review  Has not previously ordered this. [employer] message also sent to patient.    Kristine DSOUZA, RN    [facility]  [facility]  Office: [phone]  Fax: [phone]

## 2024-08-22 NOTE — TELEPHONE ENCOUNTER
Patient returned call and states he needs a glucose meter and strips for his upcoming diabetes education class Dr. Whitehead referred him to. Order placed as VORB from Dr. Whitehead.    Kristine DSOUZA, RN    [facility]  Office: [phone]  Fax: [phone]

## 2024-08-22 NOTE — TELEPHONE ENCOUNTER
[facility]    Who is the name of the provider?:  DANIELLE AZEVEDO   What is the location you see this provider at/preferred location?: [place]  Person calling / Facility: Daniel Villanueva  Phone number:  [phone] (home)   Nurse call back needed:  ?     Reason for call:  Patient called and explained that his pharmacy says he needs a prescription for his glucose strips and monitor (Dr. Azevedo is PCP) Preferred pharmacy listed below.     Pharmacy location:  [id number] [address] AT [facility]  Outside Imaging: n/a   Can we leave a detailed message on this number?  YES     8/22/2024, 11:00 AM

## 2024-09-05 ENCOUNTER — ALLIED HEALTH/NURSE VISIT (OUTPATIENT)
Dept: EDUCATION SERVICES | Facility: CLINIC | Age: 69
End: 2024-09-05
Payer: COMMERCIAL

## 2024-09-05 DIAGNOSIS — E11.9 TYPE 2 DIABETES, HBA1C GOAL < 7% (H): ICD-10-CM

## 2024-09-05 PROCEDURE — G0108 DIAB MANAGE TRN  PER INDIV: HCPCS | Mod: AE | Performed by: NUTRITIONIST

## 2024-09-05 NOTE — PATIENT INSTRUCTIONS
Leonidas Chambers,    Here are some things that we discussed today.      Goals for Diabetes Care:    1. Eat 3 balanced meals each day - Monitor carb intake and aim for 45-60 grams per meal  (3-4 carbohydrate choices) and 15 grams carbohydrate (1 carbohydrate choice) per snack (2/day)     Carbohydrate 1 choice = 15 grams     Aim to eat a balanced plate - half your plate fruits/veggies, 1/4 the plate protein and 1/4 plate starch (rice, potato, pasta)    2. Check blood sugars 1 times each day at varying times- (do both fasting blood sugar and 2hr pp)   Blood Glucose Targets:   1. Fasting and before meal target is 80 - 130 mg/dl   2. 2 hours after a meal target is < 180 mg/dl  Always remember to bring meter and/or log book to all appointments.    3. Activity really helps improve blood sugars. Try to Incorporate 30 minutes activity into each day - does not need to be all at one time & walking counts!    4. Take diabetes medications as prescribed   -Metformin 500 mg twice daily    Follow up with your Diabetic Educator to assess BG targets and need for modifications to medications and/or lifestyle.    Call with any questions.  Thank you!  Arabella Harris, RD, LD, [facility]   Certified Diabetes Care &   [facility]  Triage [phone] or Scheduling [phone]

## 2024-09-05 NOTE — PROGRESS NOTES
Diabetes Self-Management Education & Support    Presents for: Initial Assessment for new diagnosis    Type of Service: In Person Visit      ASSESSMENT:  Reyes has been monitoring blood glucose for approximately 1-1/2 weeks.  Blood glucose fasting ranging from  mg/dL and after meal readings ranging from  mg/dL.  Average for 7 days = 111 mg/dL.  He reports that he has changed his eating habits, eating smaller portions and has lost approximately 15 pounds.  He is active on treadmill or bike 4 days/week and is aiming for 10K steps per day.  Reports no issue with taking his diabetes medication.      Patient's most recent   Lab Results   Component Value Date    A1C 6.7 07/02/2024    A1C 5.5 05/21/2020     is meeting goal of <7.0    Diabetes knowledge and skills assessment:   Patient is knowledgeable in diabetes management concepts related to: Healthy Eating, Being Active, Monitoring, Taking Medication, and Healthy Coping    Continue education with the following diabetes management concepts: Problem Solving and Reducing Risks    Based on learning assessment above, most appropriate setting for further diabetes education would be: Individual setting.      PLAN    1) Monitor a few days/week 2 hours after meals and bring results to next appointment  2) Consume 3-4 carbohydrate choices per meal and 1 carbohydrate choice at snacks  3) Continue with biking/treadmill 3-4 days/week >30 minutes    Follow-up: 10/8/24    See Care Plan for co-developed, patient-state behavior change goals.  AVS provided for patient today.    Education Materials Provided:  [employer] Understanding Diabetes Booklet and My Plate Planner      SUBJECTIVE/OBJECTIVE:  Presents for: Initial Assessment for new diagnosis  Accompanied by: Self  Diabetes education in the past 24mo: No  Focus of Visit: Monitoring, Taking Medication, Healthy Eating, Being Active, Diabetes Pathophysiology  Diabetes type: Type 2  Date of diagnosis: July 2024  Disease  "course: Improving  How confident are you filling out medical forms by yourself:: Extremely  Transportation concerns: No  Difficulty affording diabetes medication?: No  Difficulty affording diabetes testing supplies?: No  Other concerns:: None  Cultural Influences/Ethnic Background:  Not  or     Diabetes Symptoms & Complications:  Diabetes Related Symptoms: None  Weight trend: Decreasing  Symptom course: Stable  Disease course: Improving  Complications assessed today?: No    Patient Problem List and Family Medical History reviewed for relevant medical history, current medical status, and diabetes risk factors.    Vitals:  There were no vitals taken for this visit.  Estimated body mass index is 46.49 kg/m  as calculated from the following:    Height as of 12/5/22: 1.778 m (5' 10\").    Weight as of 7/25/24: 147 kg (324 lb).   Last 3 BP:   BP Readings from Last 3 Encounters:   07/25/24 (!) 149/89   12/05/22 110/70   10/30/19 137/81       History   Smoking Status    Never   Smokeless Tobacco    Former    Types: Chew       Labs:  Lab Results   Component Value Date    A1C 6.7 07/02/2024    A1C 5.5 05/21/2020     Lab Results   Component Value Date     07/02/2024     05/21/2020     Lab Results   Component Value Date    LDL 31 07/02/2024    LDL 74 05/21/2020     HDL Cholesterol   Date Value Ref Range Status   05/21/2020 45 >39 mg/dL Final     Direct Measure HDL   Date Value Ref Range Status   07/02/2024 42 >=40 mg/dL Final   ]  GFR Estimate   Date Value Ref Range Status   07/02/2024 85 >60 mL/min/1.73m2 Final     Comment:     eGFR calculated using 2021 CKD-EPI equation.   05/21/2020 80 >60 mL/min/[1.73_m2] Final     Comment:     Non  GFR Calc  Starting 12/18/2018, serum creatinine based estimated GFR (eGFR) will be   calculated using the Chronic Kidney Disease Epidemiology Collaboration   (CKD-EPI) equation.       GFR Estimate If Black   Date Value Ref Range Status   05/21/2020 >90 " ">60 mL/min/[1.73_m2] Final     Comment:      GFR Calc  Starting 12/18/2018, serum creatinine based estimated GFR (eGFR) will be   calculated using the Chronic Kidney Disease Epidemiology Collaboration   (CKD-EPI) equation.       Lab Results   Component Value Date    CR 0.97 07/02/2024    CR 0.99 05/21/2020     No results found for: \"MICROALBUMIN\"    Healthy Eating:  Healthy Eating Assessed Today: Yes  Cultural/Denominational diet restrictions?: No  Meal planning/habits: Low carb  Who cooks/prepares meals for you?: Self, Spouse  Who purchases food in  your home?: Self, Spouse  How many times a week on average do you eat food made away from home (restaurant/take-out)?: 1  Meals include: Breakfast, Dinner, Afternoon Snack  Breakfast: Kind Bar & Protein Shake  Lunch: Chicken breast with red skin potatoes OR Chicken Tenders OR [place] (LC bread)  Dinner: 2 chicken sausages OR gluten free noodles with cheese OR sandwich  Beverages: Water  Has patient met with a dietitian in the past?: No    Being Active:  Being Active Assessed Today: Yes  Exercise:: Yes (treadmill or bike)  Days per week of moderate to strenuous exercise (like a brisk walk): 4  On average, minutes per day of exercise at this level: 50  How intense was your typical exercise? : Moderate (like brisk walking)  Exercise Minutes per Week: 200  Barrier to exercise: None    Monitoring:  Monitoring Assessed Today: Yes  Did patient bring glucose meter to appointment? : Yes  Blood Glucose Meter: One Touch  Times checking blood sugar at home (number): 2  Times checking blood sugar at home (per): Day  Blood glucose trend: Decreasing    Taking Medications:  Diabetes Medication(s)       Biguanides       metFORMIN (GLUCOPHAGE) 500 MG tablet Take 1 tablet (500 mg) by mouth 2 times daily (with meals)        Taking Medication Assessed Today: Yes  Current Treatments: Oral Medication (taken by mouth)  Problems taking diabetes medications regularly?: No  Diabetes " medication side effects?: No    Problem Solving:  Problem Solving Assessed Today: Yes  Is the patient at risk for hypoglycemia?: No  Is the patient at risk for DKA?: No  Does patient have severe weather/disaster plan for diabetes management?: No  Does patient have sick day plan for diabetes management?: No    Reducing Risks:  Reducing Risks Assessed Today: Yes  Diabetes Risks: Age over 45 years, Hyperlipidemia, Family History  CAD Risks: Obesity, Male sex, Dyslipidemia, Diabetes Mellitus  Has dilated eye exam at least once a year?: No  Sees dentist every 6 months?: Yes  Feet checked by healthcare provider in the last year?: No    Healthy Coping:  Healthy Coping Assessed Today: Yes  Emotional response to diabetes: Acceptance, Ready to learn  Informal Support system:: Spouse  Stage of change: ACTION (Actively working towards change)    Care Plan and Education Provided:  Healthy Eating: Balanced meals, Carbohydrate Counting, Consistency in amount and timing of carbohydrate intake, Label reading, and Weight Management, Being Active: Amount recommended (150 minutes moderate or 75 minutes vigorous activity and 2-3 days strength training per week), Monitoring: Frequency of monitoring, Individual glucose targets, Log and interpret results, and Proper technique, and Taking Medication: Action of prescribed medication(s), Side effects of prescribed medication(s), and When to take medication(s)    Arabella Harris RD, LD, [facility]     Time Spent: 60 minutes  Encounter Type: Individual    Any diabetes medication dose changes were made via the CDE Protocol per the patient's referring provider. A copy of this encounter was shared with the provider.

## 2024-09-05 NOTE — LETTER
9/5/2024         RE: Daniel Villanueva  [address]  [facility] [id number]        Dear Colleague,    Thank you for referring your patient, Daniel Villanueva, to the [facility]. Please see a copy of my visit note below.    Diabetes Self-Management Education & Support    Presents for: Initial Assessment for new diagnosis    Type of Service: In Person Visit      ASSESSMENT:  Reyes has been monitoring blood glucose for approximately 1-1/2 weeks.  Blood glucose fasting ranging from  mg/dL and after meal readings ranging from  mg/dL.  Average for 7 days = 111 mg/dL.  He reports that he has changed his eating habits, eating smaller portions and has lost approximately 15 pounds.  He is active on treadmill or bike 4 days/week and is aiming for 10K steps per day.  Reports no issue with taking his diabetes medication.      Patient's most recent   Lab Results   Component Value Date    A1C 6.7 07/02/2024    A1C 5.5 05/21/2020     is meeting goal of <7.0    Diabetes knowledge and skills assessment:   Patient is knowledgeable in diabetes management concepts related to: Healthy Eating, Being Active, Monitoring, Taking Medication, and Healthy Coping    Continue education with the following diabetes management concepts: Problem Solving and Reducing Risks    Based on learning assessment above, most appropriate setting for further diabetes education would be: Individual setting.      PLAN    1) Monitor a few days/week 2 hours after meals and bring results to next appointment  2) Consume 3-4 carbohydrate choices per meal and 1 carbohydrate choice at snacks  3) Continue with biking/treadmill 3-4 days/week >30 minutes    Follow-up: 10/8/24    See Care Plan for co-developed, patient-state behavior change goals.  AVS provided for patient today.    Education Materials Provided:  [facility] Understanding Diabetes Booklet and My Plate Planner      SUBJECTIVE/OBJECTIVE:  Presents  "for: Initial Assessment for new diagnosis  Accompanied by: Self  Diabetes education in the past 24mo: No  Focus of Visit: Monitoring, Taking Medication, Healthy Eating, Being Active, Diabetes Pathophysiology  Diabetes type: Type 2  Date of diagnosis: July 2024  Disease course: Improving  How confident are you filling out medical forms by yourself:: Extremely  Transportation concerns: No  Difficulty affording diabetes medication?: No  Difficulty affording diabetes testing supplies?: No  Other concerns:: None  Cultural Influences/Ethnic Background:  Not  or     Diabetes Symptoms & Complications:  Diabetes Related Symptoms: None  Weight trend: Decreasing  Symptom course: Stable  Disease course: Improving  Complications assessed today?: No    Patient Problem List and Family Medical History reviewed for relevant medical history, current medical status, and diabetes risk factors.    Vitals:  There were no vitals taken for this visit.  Estimated body mass index is 46.49 kg/m  as calculated from the following:    Height as of 12/5/22: 1.778 m (5' 10\").    Weight as of 7/25/24: 147 kg (324 lb).   Last 3 BP:   BP Readings from Last 3 Encounters:   07/25/24 (!) 149/89   12/05/22 110/70   10/30/19 137/81       History   Smoking Status     Never   Smokeless Tobacco     Former     Types: Chew       Labs:  Lab Results   Component Value Date    A1C 6.7 07/02/2024    A1C 5.5 05/21/2020     Lab Results   Component Value Date     07/02/2024     05/21/2020     Lab Results   Component Value Date    LDL 31 07/02/2024    LDL 74 05/21/2020     HDL Cholesterol   Date Value Ref Range Status   05/21/2020 45 >39 mg/dL Final     Direct Measure HDL   Date Value Ref Range Status   07/02/2024 42 >=40 mg/dL Final   ]  GFR Estimate   Date Value Ref Range Status   07/02/2024 85 >60 mL/min/1.73m2 Final     Comment:     eGFR calculated using 2021 CKD-EPI equation.   05/21/2020 80 >60 mL/min/[1.73_m2] Final     Comment:     " "Non  GFR Calc  Starting 12/18/2018, serum creatinine based estimated GFR (eGFR) will be   calculated using the Chronic Kidney Disease Epidemiology Collaboration   (CKD-EPI) equation.       GFR Estimate If Black   Date Value Ref Range Status   05/21/2020 >90 >60 mL/min/[1.73_m2] Final     Comment:      GFR Calc  Starting 12/18/2018, serum creatinine based estimated GFR (eGFR) will be   calculated using the Chronic Kidney Disease Epidemiology Collaboration   (CKD-EPI) equation.       Lab Results   Component Value Date    CR 0.97 07/02/2024    CR 0.99 05/21/2020     No results found for: \"MICROALBUMIN\"    Healthy Eating:  Healthy Eating Assessed Today: Yes  Cultural/Adventism diet restrictions?: No  Meal planning/habits: Low carb  Who cooks/prepares meals for you?: Self, Spouse  Who purchases food in  your home?: Self, Spouse  How many times a week on average do you eat food made away from home (restaurant/take-out)?: 1  Meals include: Breakfast, Dinner, Afternoon Snack  Breakfast: Kind Bar & Protein Shake  Lunch: Chicken breast with red skin potatoes OR Chicken Tenders OR [place] (LC bread)  Dinner: 2 chicken sausages OR gluten free noodles with cheese OR sandwich  Beverages: Water  Has patient met with a dietitian in the past?: No    Being Active:  Being Active Assessed Today: Yes  Exercise:: Yes (treadmill or bike)  Days per week of moderate to strenuous exercise (like a brisk walk): 4  On average, minutes per day of exercise at this level: 50  How intense was your typical exercise? : Moderate (like brisk walking)  Exercise Minutes per Week: 200  Barrier to exercise: None    Monitoring:  Monitoring Assessed Today: Yes  Did patient bring glucose meter to appointment? : Yes  Blood Glucose Meter: One Touch  Times checking blood sugar at home (number): 2  Times checking blood sugar at home (per): Day  Blood glucose trend: Decreasing    Taking Medications:  Diabetes Medication(s)       " Biguanides       metFORMIN (GLUCOPHAGE) 500 MG tablet Take 1 tablet (500 mg) by mouth 2 times daily (with meals)        Taking Medication Assessed Today: Yes  Current Treatments: Oral Medication (taken by mouth)  Problems taking diabetes medications regularly?: No  Diabetes medication side effects?: No    Problem Solving:  Problem Solving Assessed Today: Yes  Is the patient at risk for hypoglycemia?: No  Is the patient at risk for DKA?: No  Does patient have severe weather/disaster plan for diabetes management?: No  Does patient have sick day plan for diabetes management?: No    Reducing Risks:  Reducing Risks Assessed Today: Yes  Diabetes Risks: Age over 45 years, Hyperlipidemia, Family History  CAD Risks: Obesity, Male sex, Dyslipidemia, Diabetes Mellitus  Has dilated eye exam at least once a year?: No  Sees dentist every 6 months?: Yes  Feet checked by healthcare provider in the last year?: No    Healthy Coping:  Healthy Coping Assessed Today: Yes  Emotional response to diabetes: Acceptance, Ready to learn  Informal Support system:: Spouse  Stage of change: ACTION (Actively working towards change)    Care Plan and Education Provided:  Healthy Eating: Balanced meals, Carbohydrate Counting, Consistency in amount and timing of carbohydrate intake, Label reading, and Weight Management, Being Active: Amount recommended (150 minutes moderate or 75 minutes vigorous activity and 2-3 days strength training per week), Monitoring: Frequency of monitoring, Individual glucose targets, Log and interpret results, and Proper technique, and Taking Medication: Action of prescribed medication(s), Side effects of prescribed medication(s), and When to take medication(s)    Arabella Harris RD, LD, [facility]     Time Spent: 60 minutes  Encounter Type: Individual    Any diabetes medication dose changes were made via the CDE Protocol per the patient's referring provider. A copy of this encounter was shared with the provider.

## 2024-09-20 ENCOUNTER — TELEPHONE (OUTPATIENT)
Dept: OTHER | Facility: CLINIC | Age: 69
End: 2024-09-20
Payer: COMMERCIAL

## 2024-09-20 DIAGNOSIS — E03.9 HYPOTHYROIDISM, UNSPECIFIED TYPE: ICD-10-CM

## 2024-09-20 DIAGNOSIS — E78.5 HYPERLIPIDEMIA LDL GOAL <100: Primary | ICD-10-CM

## 2024-09-20 DIAGNOSIS — Z12.5 SCREENING FOR PROSTATE CANCER: ICD-10-CM

## 2024-09-20 NOTE — TELEPHONE ENCOUNTER
Routing to scheduling to coordinate the following:    Fasting labs  Follow up two weeks after labs in person  Please schedule this at next availability      Appt note: Follow up to 7/25/24      Ingrid Luna MA

## 2024-09-23 NOTE — TELEPHONE ENCOUNTER
Left voicemail for patient to schedule appointment(s), stated this our 2nd attempt at scheduling and provided [facility] telephone number for patient to call back to schedule.

## 2024-09-23 NOTE — TELEPHONE ENCOUNTER
Patient scheduled for fasting labs 10/22/24, follow up with Dr. Whitehead 11/18/24 (per provider availability)

## 2024-10-19 ENCOUNTER — HEALTH MAINTENANCE LETTER (OUTPATIENT)
Age: 69
End: 2024-10-19

## 2024-11-30 ENCOUNTER — LAB (OUTPATIENT)
Dept: LAB | Facility: CLINIC | Age: 69
End: 2024-11-30
Payer: COMMERCIAL

## 2024-11-30 DIAGNOSIS — E11.9 TYPE 2 DIABETES, HBA1C GOAL < 7% (H): ICD-10-CM

## 2024-11-30 DIAGNOSIS — E78.5 HYPERLIPIDEMIA LDL GOAL <100: ICD-10-CM

## 2024-11-30 LAB
ALBUMIN SERPL BCG-MCNC: 4.2 G/DL (ref 3.5–5.2)
ALP SERPL-CCNC: 52 U/L (ref 40–150)
ALT SERPL W P-5'-P-CCNC: 30 U/L (ref 0–70)
ANION GAP SERPL CALCULATED.3IONS-SCNC: 7 MMOL/L (ref 7–15)
APO A-I SERPL-MCNC: 15 MG/DL
AST SERPL W P-5'-P-CCNC: 26 U/L (ref 0–45)
BILIRUB SERPL-MCNC: 0.5 MG/DL
BUN SERPL-MCNC: 22.4 MG/DL (ref 8–23)
CALCIUM SERPL-MCNC: 9.8 MG/DL (ref 8.8–10.4)
CHLORIDE SERPL-SCNC: 102 MMOL/L (ref 98–107)
CREAT SERPL-MCNC: 1.18 MG/DL (ref 0.67–1.17)
CREAT UR-MCNC: 112 MG/DL
CRP SERPL HS-MCNC: 3.6 MG/L
EGFRCR SERPLBLD CKD-EPI 2021: 67 ML/MIN/1.73M2
EST. AVERAGE GLUCOSE BLD GHB EST-MCNC: 120 MG/DL
GLUCOSE SERPL-MCNC: 110 MG/DL (ref 70–99)
HBA1C MFR BLD: 5.8 % (ref 0–5.6)
HCO3 SERPL-SCNC: 28 MMOL/L (ref 22–29)
MICROALBUMIN UR-MCNC: <12 MG/L
MICROALBUMIN/CREAT UR: NORMAL MG/G{CREAT}
POTASSIUM SERPL-SCNC: 5 MMOL/L (ref 3.4–5.3)
PROT SERPL-MCNC: 7.3 G/DL (ref 6.4–8.3)
SODIUM SERPL-SCNC: 137 MMOL/L (ref 135–145)

## 2024-11-30 PROCEDURE — 82570 ASSAY OF URINE CREATININE: CPT

## 2024-11-30 PROCEDURE — 82043 UR ALBUMIN QUANTITATIVE: CPT

## 2024-11-30 PROCEDURE — 86141 C-REACTIVE PROTEIN HS: CPT

## 2024-11-30 PROCEDURE — 99000 SPECIMEN HANDLING OFFICE-LAB: CPT

## 2024-11-30 PROCEDURE — 80061 LIPID PANEL: CPT | Mod: 90

## 2024-11-30 PROCEDURE — 80053 COMPREHEN METABOLIC PANEL: CPT

## 2024-11-30 PROCEDURE — 36415 COLL VENOUS BLD VENIPUNCTURE: CPT

## 2024-11-30 PROCEDURE — 83704 LIPOPROTEIN BLD QUAN PART: CPT | Mod: 90

## 2024-11-30 PROCEDURE — 83695 ASSAY OF LIPOPROTEIN(A): CPT

## 2024-11-30 PROCEDURE — 83036 HEMOGLOBIN GLYCOSYLATED A1C: CPT

## 2024-12-02 LAB
CHOLEST SERPL-MCNC: 118 MG/DL
HDL SERPL QN: 8.6 NM
HDL SERPL-SCNC: 29 UMOL/L
HDLC SERPL-MCNC: 38 MG/DL
HLD.LARGE SERPL-SCNC: <2.8 UMOL/L
LDL SERPL QN: 20.6 NM
LDL SERPL-SCNC: 927 NMOL/L
LDL SMALL SERPL-SCNC: 611 NMOL/L
LDLC SERPL CALC-MCNC: 64 MG/DL
PATHOLOGY STUDY: ABNORMAL
TRIGL SERPL-MCNC: 80 MG/DL
VLDL LARGE SERPL-SCNC: 2.1 NMOL/L
VLDL SERPL QN: 49.7 NM

## 2024-12-03 ENCOUNTER — ALLIED HEALTH/NURSE VISIT (OUTPATIENT)
Dept: EDUCATION SERVICES | Facility: CLINIC | Age: 69
End: 2024-12-03
Payer: COMMERCIAL

## 2024-12-03 DIAGNOSIS — E11.9 TYPE 2 DIABETES, HBA1C GOAL < 7% (H): Primary | ICD-10-CM

## 2024-12-03 PROCEDURE — G0108 DIAB MANAGE TRN  PER INDIV: HCPCS | Mod: AE | Performed by: NUTRITIONIST

## 2024-12-03 NOTE — PROGRESS NOTES
Diabetes Self-Management Education & Support    Presents for: Follow-up    Type of Service: In Person Visit      ASSESSMENT:  Reyes reports that blood glucose readings fasting blood sugar have been <100 mg/dL most of the time and has not had any post meal readings >180 mg/dL.  Had A1c drawn last week and it reduced to 5.8%.  He continues to be active on most days, watching his portions and reports approximate 28 pound weight loss.  He will contact [facility] [place] with any questions or concerns.  He scheduled follow-up for 6 months.      Patient's most recent   Lab Results   Component Value Date    A1C 5.8 11/30/2024    A1C 5.5 05/21/2020     is meeting goal of <7.0    Diabetes knowledge and skills assessment:   Patient is knowledgeable in diabetes management concepts related to: Healthy Eating, Being Active, Monitoring, Taking Medication, Problem Solving, Reducing Risks, and Healthy Coping    Continue education with the following diabetes management concepts: Continuation of Lifestyle Changes     Based on learning assessment above, most appropriate setting for further diabetes education would be: Individual setting.      PLAN    1) Make appointment for dilated eye exam  2) Continue with current exercise plan    Follow-up: 5/6/25    See Care Plan for co-developed, patient-state behavior change goals.  AVS provided for patient today.    Education Materials Provided:  Know Your Number and Type 2 Sick Day Management      SUBJECTIVE/OBJECTIVE:  Presents for: Follow-up  Accompanied by: Self  Diabetes education in the past 24mo: Yes  Focus of Visit: Reducing Risks, Problem Solving  Diabetes type: Type 2  Date of diagnosis: July 2024  Disease course: Improving  How confident are you filling out medical forms by yourself:: Extremely  Transportation concerns: No  Difficulty affording diabetes medication?: No  Difficulty affording diabetes testing supplies?: No  Other concerns:: None  Cultural Influences/Ethnic Background:  Not   "or     Diabetes Symptoms & Complications:  Diabetes Related Symptoms: None  Weight trend: Decreasing  Symptom course: Stable  Disease course: Improving  Complications assessed today?: Yes  Autonomic neuropathy: No  CVA: No  Heart disease: No  Nephropathy: No  Peripheral neuropathy: No  Peripheral Vascular Disease: No  Retinopathy: No  Sexual dysfunction: No    Patient Problem List and Family Medical History reviewed for relevant medical history, current medical status, and diabetes risk factors.    Vitals:  There were no vitals taken for this visit.  Estimated body mass index is 46.49 kg/m  as calculated from the following:    Height as of 12/5/22: 1.778 m (5' 10\").    Weight as of 7/25/24: 147 kg (324 lb).   Last 3 BP:   BP Readings from Last 3 Encounters:   07/25/24 (!) 149/89   12/05/22 110/70   10/30/19 137/81       History   Smoking Status    Never   Smokeless Tobacco    Former    Types: Chew       Labs:  Lab Results   Component Value Date    A1C 5.8 11/30/2024    A1C 5.5 05/21/2020     Lab Results   Component Value Date     11/30/2024     05/21/2020     Lab Results   Component Value Date    LDL 31 07/02/2024    LDL 74 05/21/2020     HDL Cholesterol   Date Value Ref Range Status   05/21/2020 45 >39 mg/dL Final     Direct Measure HDL   Date Value Ref Range Status   07/02/2024 42 >=40 mg/dL Final   ]  GFR Estimate   Date Value Ref Range Status   11/30/2024 67 >60 mL/min/1.73m2 Final     Comment:     eGFR calculated using 2021 CKD-EPI equation.   05/21/2020 80 >60 mL/min/[1.73_m2] Final     Comment:     Non  GFR Calc  Starting 12/18/2018, serum creatinine based estimated GFR (eGFR) will be   calculated using the Chronic Kidney Disease Epidemiology Collaboration   (CKD-EPI) equation.       GFR Estimate If Black   Date Value Ref Range Status   05/21/2020 >90 >60 mL/min/[1.73_m2] Final     Comment:      GFR Calc  Starting 12/18/2018, serum creatinine based estimated " "GFR (eGFR) will be   calculated using the Chronic Kidney Disease Epidemiology Collaboration   (CKD-EPI) equation.       Lab Results   Component Value Date    CR 1.18 11/30/2024    CR 0.99 05/21/2020     No results found for: \"MICROALBUMIN\"    Healthy Eating:  Healthy Eating Assessed Today: Yes  Cultural/Islam diet restrictions?: No  Meal planning/habits: Low carb  Who cooks/prepares meals for you?: Self, Spouse  Who purchases food in  your home?: Self, Spouse  How many times a week on average do you eat food made away from home (restaurant/take-out)?: 1  Meals include: Breakfast, Dinner, Afternoon Snack  Breakfast: Kind Bar & Protein Shake  Lunch: Chicken breast with red skin potatoes OR Chicken Tenders OR [place] (LC bread)  Dinner: 2 chicken sausages OR gluten free noodles with cheese OR pork chop, sweet potato and green beans  Beverages: Water  Has patient met with a dietitian in the past?: Yes    Being Active:  Being Active Assessed Today: Yes  Exercise:: Yes (treadmill or bike)  Days per week of moderate to strenuous exercise (like a brisk walk): 4  On average, minutes per day of exercise at this level: 50  How intense was your typical exercise? : Moderate (like brisk walking)  Exercise Minutes per Week: 200  Barrier to exercise: None    Monitoring:  Monitoring Assessed Today: Yes  Did patient bring glucose meter to appointment? : No  Blood Glucose Meter: One Touch  Times checking blood sugar at home (number): 1  Times checking blood sugar at home (per): Day  Blood glucose trend: Decreasing      Taking Medications:  Diabetes Medication(s)       Biguanides       metFORMIN (GLUCOPHAGE) 500 MG tablet Take 1 tablet (500 mg) by mouth 2 times daily (with meals)          Taking Medication Assessed Today: Yes  Current Treatments: Oral Medication (taken by mouth)  Problems taking diabetes medications regularly?: No  Diabetes medication side effects?: No    Problem Solving:  Problem Solving Assessed Today: Yes  Is " the patient at risk for hypoglycemia?: No  Is the patient at risk for DKA?: No  Does patient have severe weather/disaster plan for diabetes management?: No  Does patient have sick day plan for diabetes management?: Yes    Reducing Risks:  Reducing Risks Assessed Today: Yes  Diabetes Risks: Age over 45 years, Hyperlipidemia, Family History  CAD Risks: Obesity, Male sex, Dyslipidemia, Diabetes Mellitus  Has dilated eye exam at least once a year?: No  Sees dentist every 6 months?: Yes  Feet checked by healthcare provider in the last year?: No    Healthy Coping:  Healthy Coping Assessed Today: Yes  Emotional response to diabetes: Acceptance, Ready to learn  Informal Support system:: Spouse  Stage of change: ACTION (Actively working towards change)  Support resources: Websites      Care Plan and Education Provided:  Problem Solving: High glucose - causes, signs/symptoms, treatment and prevention, Sick day arrangements, and When to call a health care provider, Reducing Risks: Complications of diabetes, Dental care, Eye care, Foot care, Goal for A1c, how it relates to glucose and how often to check, Preventing cardiovascular disease, including blood pressure goals, lipid goals, recommendations for cardioprotective medications, statins, and aspirin, and Prevention, early diagnostic measures and treatment of complications, and Healthy Coping: Benefits of making appropriate lifestyle changes    Arabella Harris, CAESARN, LDN, [facility]     Time Spent: 30 minutes  Encounter Type: Individual    Any diabetes medication dose changes were made via the CDE Protocol per the patient's referring provider. A copy of this encounter was shared with the provider.

## 2024-12-03 NOTE — PATIENT INSTRUCTIONS
Leonidas Chambers,    Here are some things that we discussed today.      Goals for Diabetes Care:    1. Eat 3 balanced meals each day - Monitor carb intake and aim for 45-60 grams per meal  (3-4 carbohydrate choices) and 15 grams carbohydrate (1 carbohydrate choice) per snack (2/day)     Carbohydrate 1 choice = 15 grams     Aim to eat a balanced plate - half your plate fruits/veggies, 1/4 the plate protein and 1/4 plate starch (rice, potato, pasta)    2. Check blood sugars one time each day at varying times   Blood Glucose Targets:   1. Fasting and before meal target is 80 - 130 mg/dl   2. 2 hours after a meal target is < 180 mg/dl  Always remember to bring meter and/or log book to all appointments.    3. Activity really helps improve blood sugars. Try to Incorporate 30 minutes activity into each day - does not need to be all at one time & walking counts!    4. Take diabetes medications as prescribed   -Metformin 500 mg x 2 with breakfast    5.  Make appointment for dilated eye exam    Follow up with your Diabetic Educator to assess BG targets and need for modifications to medications and/or lifestyle.    Call with any questions.  Thank you!  Arabella Harris, RD, LD, [facility]   Certified Diabetes Care &   [facility]  Triage [phone] or Scheduling [phone]

## 2024-12-03 NOTE — LETTER
12/3/2024         RE: Daniel Villanueva  [address]  [place] [address]        Dear Colleague,    Thank you for referring your patient, Daniel Villanueva, to the [facility]. Please see a copy of my visit note below.    Diabetes Self-Management Education & Support    Presents for: Follow-up    Type of Service: In Person Visit      ASSESSMENT:  Reyes reports that blood glucose readings fasting blood sugar have been <100 mg/dL most of the time and has not had any post meal readings >180 mg/dL.  Had A1c drawn last week and it reduced to 5.8%.  He continues to be active on most days, watching his portions and reports approximate 28 pound weight loss.  He will contact [facility] with any questions or concerns.  He scheduled follow-up for 6 months.      Patient's most recent   Lab Results   Component Value Date    A1C 5.8 11/30/2024    A1C 5.5 05/21/2020     is meeting goal of <7.0    Diabetes knowledge and skills assessment:   Patient is knowledgeable in diabetes management concepts related to: Healthy Eating, Being Active, Monitoring, Taking Medication, Problem Solving, Reducing Risks, and Healthy Coping    Continue education with the following diabetes management concepts: Continuation of Lifestyle Changes     Based on learning assessment above, most appropriate setting for further diabetes education would be: Individual setting.      PLAN    1) Make appointment for dilated eye exam  2) Continue with current exercise plan    Follow-up: 5/6/25    See Care Plan for co-developed, patient-state behavior change goals.  AVS provided for patient today.    Education Materials Provided:  Know Your Number and Type 2 Sick Day Management      SUBJECTIVE/OBJECTIVE:  Presents for: Follow-up  Accompanied by: Self  Diabetes education in the past 24mo: Yes  Focus of Visit: Reducing Risks, Problem Solving  Diabetes type: Type 2  Date of diagnosis: July 2024  Disease course: Improving  How  "confident are you filling out medical forms by yourself:: Extremely  Transportation concerns: No  Difficulty affording diabetes medication?: No  Difficulty affording diabetes testing supplies?: No  Other concerns:: None  Cultural Influences/Ethnic Background:  Not  or     Diabetes Symptoms & Complications:  Diabetes Related Symptoms: None  Weight trend: Decreasing  Symptom course: Stable  Disease course: Improving  Complications assessed today?: Yes  Autonomic neuropathy: No  CVA: No  Heart disease: No  Nephropathy: No  Peripheral neuropathy: No  Peripheral Vascular Disease: No  Retinopathy: No  Sexual dysfunction: No    Patient Problem List and Family Medical History reviewed for relevant medical history, current medical status, and diabetes risk factors.    Vitals:  There were no vitals taken for this visit.  Estimated body mass index is 46.49 kg/m  as calculated from the following:    Height as of 12/5/22: 1.778 m (5' 10\").    Weight as of 7/25/24: 147 kg (324 lb).   Last 3 BP:   BP Readings from Last 3 Encounters:   07/25/24 (!) 149/89   12/05/22 110/70   10/30/19 137/81       History   Smoking Status     Never   Smokeless Tobacco     Former     Types: Chew       Labs:  Lab Results   Component Value Date    A1C 5.8 11/30/2024    A1C 5.5 05/21/2020     Lab Results   Component Value Date     11/30/2024     05/21/2020     Lab Results   Component Value Date    LDL 31 07/02/2024    LDL 74 05/21/2020     HDL Cholesterol   Date Value Ref Range Status   05/21/2020 45 >39 mg/dL Final     Direct Measure HDL   Date Value Ref Range Status   07/02/2024 42 >=40 mg/dL Final   ]  GFR Estimate   Date Value Ref Range Status   11/30/2024 67 >60 mL/min/1.73m2 Final     Comment:     eGFR calculated using 2021 CKD-EPI equation.   05/21/2020 80 >60 mL/min/[1.73_m2] Final     Comment:     Non  GFR Calc  Starting 12/18/2018, serum creatinine based estimated GFR (eGFR) will be   calculated " "using the Chronic Kidney Disease Epidemiology Collaboration   (CKD-EPI) equation.       GFR Estimate If Black   Date Value Ref Range Status   05/21/2020 >90 >60 mL/min/[1.73_m2] Final     Comment:      GFR Calc  Starting 12/18/2018, serum creatinine based estimated GFR (eGFR) will be   calculated using the Chronic Kidney Disease Epidemiology Collaboration   (CKD-EPI) equation.       Lab Results   Component Value Date    CR 1.18 11/30/2024    CR 0.99 05/21/2020     No results found for: \"MICROALBUMIN\"    Healthy Eating:  Healthy Eating Assessed Today: Yes  Cultural/Caodaism diet restrictions?: No  Meal planning/habits: Low carb  Who cooks/prepares meals for you?: Self, Spouse  Who purchases food in  your home?: Self, Spouse  How many times a week on average do you eat food made away from home (restaurant/take-out)?: 1  Meals include: Breakfast, Dinner, Afternoon Snack  Breakfast: Kind Bar & Protein Shake  Lunch: Chicken breast with red skin potatoes OR Chicken Tenders OR [place] (LC bread)  Dinner: 2 chicken sausages OR gluten free noodles with cheese OR pork chop, sweet potato and green beans  Beverages: Water  Has patient met with a dietitian in the past?: Yes    Being Active:  Being Active Assessed Today: Yes  Exercise:: Yes (treadmill or bike)  Days per week of moderate to strenuous exercise (like a brisk walk): 4  On average, minutes per day of exercise at this level: 50  How intense was your typical exercise? : Moderate (like brisk walking)  Exercise Minutes per Week: 200  Barrier to exercise: None    Monitoring:  Monitoring Assessed Today: Yes  Did patient bring glucose meter to appointment? : No  Blood Glucose Meter: One Touch  Times checking blood sugar at home (number): 1  Times checking blood sugar at home (per): Day  Blood glucose trend: Decreasing      Taking Medications:  Diabetes Medication(s)       Biguanides       metFORMIN (GLUCOPHAGE) 500 MG tablet Take 1 tablet (500 mg) by mouth " 2 times daily (with meals)          Taking Medication Assessed Today: Yes  Current Treatments: Oral Medication (taken by mouth)  Problems taking diabetes medications regularly?: No  Diabetes medication side effects?: No    Problem Solving:  Problem Solving Assessed Today: Yes  Is the patient at risk for hypoglycemia?: No  Is the patient at risk for DKA?: No  Does patient have severe weather/disaster plan for diabetes management?: No  Does patient have sick day plan for diabetes management?: Yes    Reducing Risks:  Reducing Risks Assessed Today: Yes  Diabetes Risks: Age over 45 years, Hyperlipidemia, Family History  CAD Risks: Obesity, Male sex, Dyslipidemia, Diabetes Mellitus  Has dilated eye exam at least once a year?: No  Sees dentist every 6 months?: Yes  Feet checked by healthcare provider in the last year?: No    Healthy Coping:  Healthy Coping Assessed Today: Yes  Emotional response to diabetes: Acceptance, Ready to learn  Informal Support system:: Spouse  Stage of change: ACTION (Actively working towards change)  Support resources: Websites      Care Plan and Education Provided:  Problem Solving: High glucose - causes, signs/symptoms, treatment and prevention, Sick day arrangements, and When to call a health care provider, Reducing Risks: Complications of diabetes, Dental care, Eye care, Foot care, Goal for A1c, how it relates to glucose and how often to check, Preventing cardiovascular disease, including blood pressure goals, lipid goals, recommendations for cardioprotective medications, statins, and aspirin, and Prevention, early diagnostic measures and treatment of complications, and Healthy Coping: Benefits of making appropriate lifestyle changes    Arabella Harris, RDN, LDN, CDCES     Time Spent: 30 minutes  Encounter Type: Individual    Any diabetes medication dose changes were made via the CDE Protocol per the patient's referring provider. A copy of this encounter was shared with the provider.

## 2025-02-12 ENCOUNTER — TELEPHONE (OUTPATIENT)
Dept: OTHER | Facility: CLINIC | Age: 70
End: 2025-02-12
Payer: COMMERCIAL

## 2025-02-12 DIAGNOSIS — E78.5 HYPERLIPIDEMIA LDL GOAL <100: ICD-10-CM

## 2025-02-12 DIAGNOSIS — E11.9 TYPE 2 DIABETES, HBA1C GOAL < 7% (H): ICD-10-CM

## 2025-02-12 DIAGNOSIS — I10 ESSENTIAL HYPERTENSION: ICD-10-CM

## 2025-02-12 RX ORDER — SIMVASTATIN 40 MG
40 TABLET ORAL AT BEDTIME
Qty: 100 TABLET | Refills: 1 | Status: SHIPPED | OUTPATIENT
Start: 2025-02-12

## 2025-02-12 RX ORDER — LISINOPRIL 10 MG/1
10 TABLET ORAL DAILY
Qty: 100 TABLET | Refills: 1 | Status: SHIPPED | OUTPATIENT
Start: 2025-02-12

## 2025-02-12 NOTE — TELEPHONE ENCOUNTER
Patient has [facility] coverage and with this insurance plan, the patient is eligible to receive certain prescriptions as a 100-day supply at the 90-day supply cost.      Prescriptions updated to 100-day supply per protocol: metformin, lisinopril, and simvastatin      Juliet LeonardoD, [facility]  Population Health Pharmacist  [phone]

## 2025-03-22 ENCOUNTER — HEALTH MAINTENANCE LETTER (OUTPATIENT)
Age: 70
End: 2025-03-22

## 2025-06-05 ENCOUNTER — OFFICE VISIT (OUTPATIENT)
Dept: OTHER | Facility: CLINIC | Age: 70
End: 2025-06-05
Attending: INTERNAL MEDICINE
Payer: COMMERCIAL

## 2025-06-05 VITALS
HEART RATE: 95 BPM | SYSTOLIC BLOOD PRESSURE: 123 MMHG | DIASTOLIC BLOOD PRESSURE: 81 MMHG | WEIGHT: 299 LBS | BODY MASS INDEX: 42.9 KG/M2 | OXYGEN SATURATION: 93 %

## 2025-06-05 DIAGNOSIS — E11.9 TYPE 2 DIABETES, HBA1C GOAL < 7% (H): ICD-10-CM

## 2025-06-05 DIAGNOSIS — I10 ESSENTIAL HYPERTENSION: ICD-10-CM

## 2025-06-05 DIAGNOSIS — E78.5 HYPERLIPIDEMIA LDL GOAL <100: ICD-10-CM

## 2025-06-05 DIAGNOSIS — Z00.00 MEDICARE ANNUAL WELLNESS VISIT, SUBSEQUENT: Primary | ICD-10-CM

## 2025-06-05 DIAGNOSIS — Z12.5 SCREENING FOR PROSTATE CANCER: ICD-10-CM

## 2025-06-05 DIAGNOSIS — E03.9 HYPOTHYROIDISM, UNSPECIFIED TYPE: ICD-10-CM

## 2025-06-05 PROCEDURE — G0463 HOSPITAL OUTPT CLINIC VISIT: HCPCS | Performed by: INTERNAL MEDICINE

## 2025-06-05 RX ORDER — SIMVASTATIN 40 MG
40 TABLET ORAL AT BEDTIME
Qty: 100 TABLET | Refills: 3 | Status: SHIPPED | OUTPATIENT
Start: 2025-06-05

## 2025-06-05 RX ORDER — LEVOTHYROXINE SODIUM 200 UG/1
200 TABLET ORAL DAILY
Qty: 90 TABLET | Refills: 3 | Status: SHIPPED | OUTPATIENT
Start: 2025-06-05

## 2025-06-05 RX ORDER — LISINOPRIL 10 MG/1
10 TABLET ORAL DAILY
Qty: 100 TABLET | Refills: 3 | Status: SHIPPED | OUTPATIENT
Start: 2025-06-05

## 2025-06-05 NOTE — PROGRESS NOTES
VASCULAR MEDICINE FOLLOW UP VISIT    A/P:      (Z00.00) Medicare annual wellness visit, subsequent  (primary encounter diagnosis)     Comment: looks well     Plan: RTC one year for annual physical        (Z12.5) Screening for prostate cancer     Plan: OFFICE/OUTPT VISIT,EST,LEVL IV, Check PSA in one year        (E78.5) Hyperlipidemia LDL goal <100     Comment: at goal     Plan: OFFICE/OUTPT VISIT,EST,LEVL IV, simvastatin (ZOCOR) 40 MG tablet, C-Reactive         Protein, High Sensitivity, LipoFit by NMR,         Lipoprotein (a), T3 Free, T4 free, TSH            (E03.9) Hypothyroidism, unspecified type     Comment:euthyroid     Plan: OFFICE/OUTPT VISIT,EST,LEVL IV, continue levothyroxine (SYNTHROID/LEVOTHROID) 200 MCG         tablet, T3 Free, T4 free, TSH               (E11.9) Type 2 diabetes, HbA1c goal < 7% (H)     Comment: At goal on metformin.     Plan: metFORMIN (GLUCOPHAGE) 500 MG tablet, Albumin         Random Urine Quantitative with Creat Ratio,         Comprehensive metabolic panel, Hemoglobin A1c,         OFFICE/OUTPT VISIT,EST,LEVL IV, Adult Diabetes         Education  Referral, lisinopril         (ZESTRIL) 10 MG tablet                (I10) Essential hypertension     Comment: start the below     Plan: lisinopril (ZESTRIL) 10 MG tablet, OFFICE/OUTPT VISIT,EST,LEVL IV                             RTC for labs in 05/2026, see me two weeks later.         The longitudinal care of plan for Reyes was addressed during this visit. Due to added complexity of care, we will continue to support Daniel Villanueva  and the subsequent management of these conditions and with ongoing continuity of care for these conditions.        Greater than one half the 40 minutes not spent on preventive care during this visit was spent providing aducation and counselling regarding item(s) # 2 onward as delineated above.                Follow Up/Next Steps    No follow-ups on file.  Patient was scheduled for a follow up visit PCP to  discuss diabetes    Counseling and Education  Reviewed preventive health counseling, as reflected in patient instructions        Appropriate preventive services were discussed with this patient, including applicable screening as appropriate for cardiovascular disease, diabetes, osteopenia/osteoporosis, and glaucoma.  As appropriate for age/gender, discussed screening for colorectal cancer, prostate cancer, breast cancer, and cervical cancer. Checklist reviewing preventive services available has been given to the patient.    Reviewed patients plan of care and provided an AVS. The Basic Care Plan (routine screening as documented in Health Maintenance) for Daniel meets the Care Plan requirement. This Care Plan has been established and reviewed with the Patient.    Visit Provider: Oneal Whitehead MD  Supervising Provider: Oneal Whitehead MD  [facility] TEQUILA Chambers is a 69 year old who is being seen for an Annual Wellness Visit  accompanied by himself.    DAVID      >name is a 69 year old male with htn, HLD, DM2 presenting for annual physical as well as lab and med review.  Do you feel safe in your environment? Yes    Have you ever done Advance Care Planning? (For example, a Health Directive, POLST, or a discussion with a medical provider or your loved ones about your wishes): Yes, advance care planning is on file.    Fall risk  Fallen 2 or more times in the past year?: No  Cognitive Screening   1) Repeat 3 items (Leader, Season, Table)    2) Clock draw:   3) 3 item recall: Recalls 3 objects  Results: 3 items recalled: COGNITIVE IMPAIRMENT LESS LIKELY    Mini-CogTM Copyright JASPAL Singh. Licensed by the author for use in [facility]; reprinted with permission ([email or web address]). All rights reserved.      Do you have sleep apnea, excessive snoring or daytime drowsiness? : no    Reviewed and updated as needed this visit by clinical staff                  Social  "History     Tobacco Use    Smoking status: Never    Smokeless tobacco: Former     Types: Chew    Tobacco comments:     Years ago   Substance Use Topics    Alcohol use: Yes     Comment: rarely       Current providers sharing in care for this patient include:   Patient Care Team:  Oneal Whitehead MD as PCP - General (Internal Medicine)  Arabella Dykes RD as Diabetes Educator (Diabetes Education)  Oneal Whitehead MD as Assigned Heart and Vascular Provider    The following health maintenance items are reviewed in Epic and correct as of today:  Health Maintenance Due   Topic Date Due    DIABETIC FOOT EXAM  Never done    ADVANCE CARE PLANNING  Never done    EYE EXAM  Never done    HEPATITIS C SCREENING  Never done    PNEUMOCOCCAL VACCINE 50+ YEARS (1 of 2 - PCV) Never done    DTAP/TDAP/TD VACCINE (1 - Tdap) Never done    ZOSTER VACCINE (1 of 2) Never done    RSV VACCINE (1 - Risk 60-74 years 1-dose series) Never done    FALL RISK ASSESSMENT  05/28/2021    COVID-19 VACCINE (3 - 2024-25 season) 09/01/2024    PHQ-2 (once per calendar year)  01/01/2025    A1C  02/28/2025    LIPID  07/02/2025    TSH W/FREE T4 REFLEX  07/02/2025     Review Of Systems  Skin: negative  Eyes: negative  Ears/Nose/Throat: negative  Respiratory: No shortness of breath, dyspnea on exertion, cough, or hemoptysis  Cardiovascular: negative  Gastrointestinal: negative  Genitourinary: negative  Musculoskeletal: negative  Neurologic: negative  Psychiatric: negative  Hematologic/Lymphatic/Immunologic: negative  Endocrine: negative            Objective    /81 (BP Location: Right arm, Patient Position: Sitting, Cuff Size: Adult Large)   Pulse 95   Wt 299 lb (135.6 kg)   SpO2 93%   BMI 42.90 kg/m   Estimated body mass index is 46.49 kg/m  as calculated from the following:    Height as of 12/5/22: 5' 10\" (1.778 m).    Weight as of 7/25/24: 324 lb (147 kg).  Physical Exam  Patient appears comfortable and in no acute distress.   "        Physical exam Reveals:    O/P: WNL  HEENT: WNL  NECK: No JVD, thyromegaly, or lymphadenopathy  HEART: RRR, no murmurs, gallops, or rubs  LUNGS: CTA bilaterally without rales, wheezes, or rhonchi  GI: NABS, nondistended, nontender, soft  EXT:without cyanosis, clubbing, or edema  NEURO: nonfocal  : no flank tenderness           All relevant labs and imaging reviewed by myself on today's date.      Identified Health Risks: None

## 2025-06-05 NOTE — PROGRESS NOTES
[facility]        Patient is here for a follow up.    Pt is currently taking Statin.    /81 (BP Location: Right arm, Patient Position: Sitting, Cuff Size: Adult Large)   Pulse 95   Wt 299 lb (135.6 kg)   SpO2 93%   BMI 42.90 kg/m      The provider has been notified that the patient has no concerns.     Questions patient would like addressed today are: N/A.    Refills are needed: No    Has homecare services and agency name:  Lola Luna MA

## 2025-07-05 ENCOUNTER — HEALTH MAINTENANCE LETTER (OUTPATIENT)
Age: 70
End: 2025-07-05

## (undated) DEVICE — GLOVE PROTEXIS MICRO 8.0  2D73PM80

## (undated) DEVICE — Device

## (undated) DEVICE — EYE PACK CUSTOM ANTERIOR 45DEG TIP CENTURION PPK6925-01

## (undated) DEVICE — EYE RING MALYUGIN PUPIL EXPANDER 6.25MM MAL-000-1

## (undated) DEVICE — GLOVE PROTEXIS MICRO 7.0  2D73PM70

## (undated) DEVICE — SU VICRYL 8-0 TG160-8 18" J547G

## (undated) DEVICE — EYE PACK 23GA CONSTELLATION PPK4254-03

## (undated) DEVICE — SYR 01ML 25GA 5/8" TBC

## (undated) DEVICE — EYE KNIFE SLIT XSTAR VISITEC 2.5MM 45DEG BEVEL UP 373725

## (undated) DEVICE — SU PLAIN 6-0 TG140-8 18" 1735G

## (undated) DEVICE — EYE PACK BVI READYPAK KIT #1

## (undated) DEVICE — EYE BACK FLUSH SOFT TIP 25GA VITREORETINAL 337.84

## (undated) DEVICE — EYE CANN IRR 30GA  ANTERIOR CHAMBER 581273

## (undated) DEVICE — GOWN LG DISP 9515

## (undated) DEVICE — LINEN TOWEL PACK X5 5464

## (undated) DEVICE — NDL 18GA 1.5" 305196

## (undated) DEVICE — PACK CATARACT CUSTOM SO DALE SEY32CTFCX

## (undated) DEVICE — GLOVE PROTEXIS MICRO 6.5  2D73PM65

## (undated) DEVICE — EYE CANN SOFT TIP 23GA FOR VALVED SET 8065149527

## (undated) DEVICE — DRAPE MICROSOPE ZEISS 52X150" 6120

## (undated) DEVICE — EYE KNIFE SLIT XSTAR VISITEC 2.6MM 45DEG 373726

## (undated) DEVICE — EYE SOL BSS 500ML

## (undated) DEVICE — EYE SHIELD PLASTIC

## (undated) DEVICE — PACK VITRECTOMY PQ15VI57E

## (undated) DEVICE — EYE LENS VITRECTOMY MAGNIFYING ODVM

## (undated) DEVICE — SU VICRYL 7-0 TG140-8DA 18" J546G

## (undated) DEVICE — DRAPE SHEET REV FOLD 3/4 9349

## (undated) DEVICE — EYE TIP IRRIGATION & ASPIRATION POLYMER CVD 0.3MM 8065751512

## (undated) RX ORDER — LIDOCAINE HYDROCHLORIDE 10 MG/ML
INJECTION, SOLUTION EPIDURAL; INFILTRATION; INTRACAUDAL; PERINEURAL
Status: DISPENSED
Start: 2017-12-13

## (undated) RX ORDER — GLYCOPYRROLATE 0.2 MG/ML
INJECTION, SOLUTION INTRAMUSCULAR; INTRAVENOUS
Status: DISPENSED
Start: 2017-12-13

## (undated) RX ORDER — BUPIVACAINE HYDROCHLORIDE 7.5 MG/ML
INJECTION, SOLUTION EPIDURAL; RETROBULBAR
Status: DISPENSED
Start: 2017-11-29

## (undated) RX ORDER — TRIAMCINOLONE ACETONIDE 40 MG/ML
INJECTION, SUSPENSION INTRA-ARTICULAR; INTRAMUSCULAR
Status: DISPENSED
Start: 2017-11-29

## (undated) RX ORDER — TETRACAINE HYDROCHLORIDE 5 MG/ML
SOLUTION OPHTHALMIC
Status: DISPENSED
Start: 2017-11-29

## (undated) RX ORDER — ONDANSETRON 2 MG/ML
INJECTION INTRAMUSCULAR; INTRAVENOUS
Status: DISPENSED
Start: 2017-11-29

## (undated) RX ORDER — LIDOCAINE HYDROCHLORIDE 10 MG/ML
INJECTION, SOLUTION EPIDURAL; INFILTRATION; INTRACAUDAL; PERINEURAL
Status: DISPENSED
Start: 2017-11-29